# Patient Record
Sex: FEMALE | NOT HISPANIC OR LATINO
[De-identification: names, ages, dates, MRNs, and addresses within clinical notes are randomized per-mention and may not be internally consistent; named-entity substitution may affect disease eponyms.]

---

## 2017-11-16 ENCOUNTER — APPOINTMENT (OUTPATIENT)
Dept: INTERNAL MEDICINE | Facility: CLINIC | Age: 72
End: 2017-11-16
Payer: MEDICARE

## 2017-11-16 ENCOUNTER — LABORATORY RESULT (OUTPATIENT)
Age: 72
End: 2017-11-16

## 2017-11-16 VITALS
HEART RATE: 83 BPM | DIASTOLIC BLOOD PRESSURE: 72 MMHG | RESPIRATION RATE: 16 BRPM | HEIGHT: 58 IN | OXYGEN SATURATION: 96 % | TEMPERATURE: 98.9 F | WEIGHT: 104 LBS | SYSTOLIC BLOOD PRESSURE: 123 MMHG | BODY MASS INDEX: 21.83 KG/M2

## 2017-11-16 DIAGNOSIS — E78.5 HYPERLIPIDEMIA, UNSPECIFIED: ICD-10-CM

## 2017-11-16 DIAGNOSIS — I10 ESSENTIAL (PRIMARY) HYPERTENSION: ICD-10-CM

## 2017-11-16 DIAGNOSIS — M79.1 MYALGIA: ICD-10-CM

## 2017-11-16 DIAGNOSIS — M25.50 PAIN IN UNSPECIFIED JOINT: ICD-10-CM

## 2017-11-16 DIAGNOSIS — Z86.19 PERSONAL HISTORY OF OTHER INFECTIOUS AND PARASITIC DISEASES: ICD-10-CM

## 2017-11-16 DIAGNOSIS — D49.59 NEOPLASM UNSPECIFIED BEHAVIOR OF OTHER GENITOURINARY ORGAN: ICD-10-CM

## 2017-11-16 DIAGNOSIS — R10.13 EPIGASTRIC PAIN: ICD-10-CM

## 2017-11-16 DIAGNOSIS — Z80.1 FAMILY HISTORY OF MALIGNANT NEOPLASM OF TRACHEA, BRONCHUS AND LUNG: ICD-10-CM

## 2017-11-16 DIAGNOSIS — E11.9 TYPE 2 DIABETES MELLITUS W/OUT COMPLICATIONS: ICD-10-CM

## 2017-11-16 DIAGNOSIS — R14.0 ABDOMINAL DISTENSION (GASEOUS): ICD-10-CM

## 2017-11-16 DIAGNOSIS — Z82.49 FAMILY HISTORY OF ISCHEMIC HEART DISEASE AND OTHER DISEASES OF THE CIRCULATORY SYSTEM: ICD-10-CM

## 2017-11-16 DIAGNOSIS — Z63.4 DISAPPEARANCE AND DEATH OF FAMILY MEMBER: ICD-10-CM

## 2017-11-16 DIAGNOSIS — K80.20 CALCULUS OF GALLBLADDER W/OUT CHOLECYSTITIS W/OUT OBSTRUCTION: ICD-10-CM

## 2017-11-16 PROBLEM — Z00.00 ENCOUNTER FOR PREVENTIVE HEALTH EXAMINATION: Status: ACTIVE | Noted: 2017-11-16

## 2017-11-16 PROCEDURE — 99203 OFFICE O/P NEW LOW 30 MIN: CPT

## 2017-11-16 RX ORDER — METFORMIN ER 750 MG 750 MG/1
750 TABLET ORAL TWICE DAILY
Refills: 0 | Status: ACTIVE | COMMUNITY
Start: 2017-11-16

## 2017-11-16 RX ORDER — NEBIVOLOL HYDROCHLORIDE 2.5 MG/1
2.5 TABLET ORAL DAILY
Refills: 0 | Status: ACTIVE | COMMUNITY
Start: 2017-11-16

## 2017-11-16 SDOH — SOCIAL STABILITY - SOCIAL INSECURITY: DISSAPEARANCE AND DEATH OF FAMILY MEMBER: Z63.4

## 2017-11-17 ENCOUNTER — RESULT REVIEW (OUTPATIENT)
Age: 72
End: 2017-11-17

## 2017-11-17 DIAGNOSIS — R74.8 ABNORMAL LEVELS OF OTHER SERUM ENZYMES: ICD-10-CM

## 2017-11-17 DIAGNOSIS — R76.8 OTHER SPECIFIED ABNORMAL IMMUNOLOGICAL FINDINGS IN SERUM: ICD-10-CM

## 2017-11-17 DIAGNOSIS — R17 UNSPECIFIED JAUNDICE: ICD-10-CM

## 2017-11-17 LAB
ALBUMIN SERPL ELPH-MCNC: 4 G/DL
ALP BLD-CCNC: 335 U/L
ALT SERPL-CCNC: 278 U/L
ANION GAP SERPL CALC-SCNC: 20 MMOL/L
AST SERPL-CCNC: 216 U/L
BASOPHILS # BLD AUTO: 0.01 K/UL
BASOPHILS NFR BLD AUTO: 0.1 %
BILIRUB SERPL-MCNC: 1.6 MG/DL
BUN SERPL-MCNC: 19 MG/DL
CALCIUM SERPL-MCNC: 10.3 MG/DL
CCP AB SER IA-ACNC: <8 UNITS
CHLORIDE SERPL-SCNC: 100 MMOL/L
CK SERPL-CCNC: 36 U/L
CO2 SERPL-SCNC: 21 MMOL/L
CREAT SERPL-MCNC: 0.92 MG/DL
CRP SERPL-MCNC: 7 MG/DL
EOSINOPHIL # BLD AUTO: 0.06 K/UL
EOSINOPHIL NFR BLD AUTO: 0.4 %
ERYTHROCYTE [SEDIMENTATION RATE] IN BLOOD BY WESTERGREN METHOD: 36 MM/HR
GLUCOSE SERPL-MCNC: 121 MG/DL
HCT VFR BLD CALC: 39.6 %
HGB BLD-MCNC: 12.6 G/DL
IMM GRANULOCYTES NFR BLD AUTO: 0.1 %
LYMPHOCYTES # BLD AUTO: 0.98 K/UL
LYMPHOCYTES NFR BLD AUTO: 7.2 %
MAN DIFF?: NORMAL
MCHC RBC-ENTMCNC: 29.7 PG
MCHC RBC-ENTMCNC: 31.8 GM/DL
MCV RBC AUTO: 93.4 FL
MONOCYTES # BLD AUTO: 0.67 K/UL
MONOCYTES NFR BLD AUTO: 4.9 %
NEUTROPHILS # BLD AUTO: 11.8 K/UL
NEUTROPHILS NFR BLD AUTO: 87.3 %
PLATELET # BLD AUTO: 233 K/UL
POTASSIUM SERPL-SCNC: 4.3 MMOL/L
PROT SERPL-MCNC: 8.3 G/DL
RBC # BLD: 4.24 M/UL
RBC # FLD: 14.2 %
RF+CCP IGG SER-IMP: NEGATIVE
RHEUMATOID FACT SER QL: 32 IU/ML
SODIUM SERPL-SCNC: 141 MMOL/L
TSH SERPL-ACNC: 0.87 UIU/ML
UREA BREATH TEST QL: NEGATIVE
WBC # FLD AUTO: 13.54 K/UL

## 2017-11-17 RX ORDER — ATORVASTATIN CALCIUM 40 MG/1
40 TABLET, FILM COATED ORAL DAILY
Qty: 90 | Refills: 3 | Status: ACTIVE | COMMUNITY

## 2017-11-17 RX ORDER — AMLODIPINE AND OLMESARTAN MEDOXOMIL 5; 20 MG/1; MG/1
5-20 TABLET ORAL DAILY
Refills: 0 | Status: ACTIVE | COMMUNITY
Start: 2017-10-02

## 2017-11-18 ENCOUNTER — INPATIENT (INPATIENT)
Facility: HOSPITAL | Age: 72
LOS: 6 days | Discharge: HOME | DRG: 418 | End: 2017-11-25
Attending: SURGERY | Admitting: SURGERY
Payer: MEDICARE

## 2017-11-18 VITALS
WEIGHT: 110.01 LBS | OXYGEN SATURATION: 96 % | HEART RATE: 90 BPM | HEIGHT: 60 IN | TEMPERATURE: 97 F | RESPIRATION RATE: 16 BRPM | SYSTOLIC BLOOD PRESSURE: 125 MMHG | DIASTOLIC BLOOD PRESSURE: 68 MMHG

## 2017-11-18 DIAGNOSIS — Z79.899 OTHER LONG TERM (CURRENT) DRUG THERAPY: ICD-10-CM

## 2017-11-18 DIAGNOSIS — E11.9 TYPE 2 DIABETES MELLITUS WITHOUT COMPLICATIONS: ICD-10-CM

## 2017-11-18 DIAGNOSIS — I97.620 POSTPROCEDURAL HEMORRHAGE OF A CIRCULATORY SYSTEM ORGAN OR STRUCTURE FOLLOWING OTHER PROCEDURE: ICD-10-CM

## 2017-11-18 DIAGNOSIS — Z90.722 ACQUIRED ABSENCE OF OVARIES, BILATERAL: ICD-10-CM

## 2017-11-18 DIAGNOSIS — K80.50 CALCULUS OF BILE DUCT WITHOUT CHOLANGITIS OR CHOLECYSTITIS WITHOUT OBSTRUCTION: ICD-10-CM

## 2017-11-18 DIAGNOSIS — Z79.84 LONG TERM (CURRENT) USE OF ORAL HYPOGLYCEMIC DRUGS: ICD-10-CM

## 2017-11-18 DIAGNOSIS — I10 ESSENTIAL (PRIMARY) HYPERTENSION: ICD-10-CM

## 2017-11-18 DIAGNOSIS — R21 RASH AND OTHER NONSPECIFIC SKIN ERUPTION: ICD-10-CM

## 2017-11-18 DIAGNOSIS — I95.89 OTHER HYPOTENSION: ICD-10-CM

## 2017-11-18 DIAGNOSIS — Z90.710 ACQUIRED ABSENCE OF BOTH CERVIX AND UTERUS: Chronic | ICD-10-CM
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
## 2017-11-18 DIAGNOSIS — Z90.79 ACQUIRED ABSENCE OF OTHER GENITAL ORGAN(S): ICD-10-CM

## 2017-11-18 DIAGNOSIS — O34.219 MATERNAL CARE FOR UNSPECIFIED TYPE SCAR FROM PREVIOUS CESAREAN DELIVERY: Chronic | ICD-10-CM

## 2017-11-18 DIAGNOSIS — K80.20 CALCULUS OF GALLBLADDER WITHOUT CHOLECYSTITIS WITHOUT OBSTRUCTION: ICD-10-CM

## 2017-11-18 DIAGNOSIS — E78.00 PURE HYPERCHOLESTEROLEMIA, UNSPECIFIED: ICD-10-CM

## 2017-11-18 DIAGNOSIS — Z28.21 IMMUNIZATION NOT CARRIED OUT BECAUSE OF PATIENT REFUSAL: ICD-10-CM

## 2017-11-18 DIAGNOSIS — Z90.710 ACQUIRED ABSENCE OF BOTH CERVIX AND UTERUS: ICD-10-CM

## 2017-11-18 DIAGNOSIS — K59.00 CONSTIPATION, UNSPECIFIED: ICD-10-CM

## 2017-11-18 DIAGNOSIS — K80.62 CALCULUS OF GALLBLADDER AND BILE DUCT WITH ACUTE CHOLECYSTITIS WITHOUT OBSTRUCTION: ICD-10-CM

## 2017-11-18 DIAGNOSIS — D62 ACUTE POSTHEMORRHAGIC ANEMIA: ICD-10-CM

## 2017-11-18 LAB
ALBUMIN SERPL ELPH-MCNC: 3.8 G/DL — SIGNIFICANT CHANGE UP (ref 3.3–5)
ALP SERPL-CCNC: 503 U/L — HIGH (ref 40–120)
ALT FLD-CCNC: 219 U/L — HIGH (ref 10–45)
AMYLASE P1 CFR SERPL: 104 U/L — SIGNIFICANT CHANGE UP (ref 25–125)
ANA SER IF-ACNC: NEGATIVE
ANION GAP SERPL CALC-SCNC: 12 MMOL/L — SIGNIFICANT CHANGE UP (ref 5–17)
APPEARANCE UR: (no result)
APTT BLD: 32.4 SEC — SIGNIFICANT CHANGE UP (ref 27.5–37.4)
AST SERPL-CCNC: 153 U/L — HIGH (ref 10–40)
BACTERIA # UR AUTO: PRESENT /HPF
BASOPHILS NFR BLD AUTO: 0.1 % — SIGNIFICANT CHANGE UP (ref 0–2)
BILIRUB SERPL-MCNC: 1.7 MG/DL — HIGH (ref 0.2–1.2)
BILIRUB UR-MCNC: NEGATIVE — SIGNIFICANT CHANGE UP
BLD GP AB SCN SERPL QL: NEGATIVE — SIGNIFICANT CHANGE UP
BLD GP AB SCN SERPL QL: NEGATIVE — SIGNIFICANT CHANGE UP
BUN SERPL-MCNC: 16 MG/DL — SIGNIFICANT CHANGE UP (ref 7–23)
CALCIUM SERPL-MCNC: 9.3 MG/DL — SIGNIFICANT CHANGE UP (ref 8.4–10.5)
CHLORIDE SERPL-SCNC: 98 MMOL/L — SIGNIFICANT CHANGE UP (ref 96–108)
CO2 SERPL-SCNC: 26 MMOL/L — SIGNIFICANT CHANGE UP (ref 22–31)
COLOR SPEC: YELLOW — SIGNIFICANT CHANGE UP
COMMENT - URINE: SIGNIFICANT CHANGE UP
CREAT SERPL-MCNC: 0.78 MG/DL — SIGNIFICANT CHANGE UP (ref 0.5–1.3)
DIFF PNL FLD: NEGATIVE — SIGNIFICANT CHANGE UP
EOSINOPHIL NFR BLD AUTO: 0.6 % — SIGNIFICANT CHANGE UP (ref 0–6)
EPI CELLS # UR: (no result) /HPF (ref 0–5)
EXTRA BLUE TOP TUBE: SIGNIFICANT CHANGE UP
EXTRA SST TUBE: SIGNIFICANT CHANGE UP
GLUCOSE BLDC GLUCOMTR-MCNC: 80 MG/DL — SIGNIFICANT CHANGE UP (ref 70–99)
GLUCOSE SERPL-MCNC: 128 MG/DL — HIGH (ref 70–99)
GLUCOSE UR QL: NEGATIVE — SIGNIFICANT CHANGE UP
HCT VFR BLD CALC: 37 % — SIGNIFICANT CHANGE UP (ref 34.5–45)
HGB BLD-MCNC: 12.3 G/DL — SIGNIFICANT CHANGE UP (ref 11.5–15.5)
INR BLD: 1.01 — SIGNIFICANT CHANGE UP (ref 0.88–1.16)
KETONES UR-MCNC: (no result) MG/DL
LACTATE SERPL-SCNC: 1.4 MMOL/L — SIGNIFICANT CHANGE UP (ref 0.5–2)
LEUKOCYTE ESTERASE UR-ACNC: (no result)
LIDOCAIN IGE QN: 74 U/L — HIGH (ref 7–60)
LYMPHOCYTES # BLD AUTO: 16.1 % — SIGNIFICANT CHANGE UP (ref 13–44)
MCHC RBC-ENTMCNC: 29.9 PG — SIGNIFICANT CHANGE UP (ref 27–34)
MCHC RBC-ENTMCNC: 33.2 G/DL — SIGNIFICANT CHANGE UP (ref 32–36)
MCV RBC AUTO: 89.8 FL — SIGNIFICANT CHANGE UP (ref 80–100)
MONOCYTES NFR BLD AUTO: 5.7 % — SIGNIFICANT CHANGE UP (ref 2–14)
NEUTROPHILS NFR BLD AUTO: 77.5 % — HIGH (ref 43–77)
NITRITE UR-MCNC: NEGATIVE — SIGNIFICANT CHANGE UP
PH UR: 6 — SIGNIFICANT CHANGE UP (ref 5–8)
PLATELET # BLD AUTO: 241 K/UL — SIGNIFICANT CHANGE UP (ref 150–400)
POTASSIUM SERPL-MCNC: 4.2 MMOL/L — SIGNIFICANT CHANGE UP (ref 3.5–5.3)
POTASSIUM SERPL-SCNC: 4.2 MMOL/L — SIGNIFICANT CHANGE UP (ref 3.5–5.3)
PROT SERPL-MCNC: 8.4 G/DL — HIGH (ref 6–8.3)
PROT UR-MCNC: NEGATIVE MG/DL — SIGNIFICANT CHANGE UP
PROTHROM AB SERPL-ACNC: 11.2 SEC — SIGNIFICANT CHANGE UP (ref 9.8–12.7)
RBC # BLD: 4.12 M/UL — SIGNIFICANT CHANGE UP (ref 3.8–5.2)
RBC # FLD: 13.5 % — SIGNIFICANT CHANGE UP (ref 10.3–16.9)
RH IG SCN BLD-IMP: POSITIVE — SIGNIFICANT CHANGE UP
RH IG SCN BLD-IMP: POSITIVE — SIGNIFICANT CHANGE UP
SODIUM SERPL-SCNC: 136 MMOL/L — SIGNIFICANT CHANGE UP (ref 135–145)
SP GR SPEC: <=1.005 — SIGNIFICANT CHANGE UP (ref 1–1.03)
UROBILINOGEN FLD QL: 2 E.U./DL
WBC # BLD: 8.9 K/UL — SIGNIFICANT CHANGE UP (ref 3.8–10.5)
WBC # FLD AUTO: 8.9 K/UL — SIGNIFICANT CHANGE UP (ref 3.8–10.5)
WBC UR QL: < 5 /HPF — SIGNIFICANT CHANGE UP

## 2017-11-18 PROCEDURE — 99285 EMERGENCY DEPT VISIT HI MDM: CPT

## 2017-11-18 PROCEDURE — 74181 MRI ABDOMEN W/O CONTRAST: CPT | Mod: 26

## 2017-11-18 PROCEDURE — 71010: CPT | Mod: 26

## 2017-11-18 RX ORDER — METFORMIN HYDROCHLORIDE 850 MG/1
750 TABLET ORAL DAILY
Qty: 0 | Refills: 0 | Status: DISCONTINUED | OUTPATIENT
Start: 2017-11-18 | End: 2017-11-19

## 2017-11-18 RX ORDER — SODIUM CHLORIDE 9 MG/ML
1000 INJECTION, SOLUTION INTRAVENOUS
Qty: 0 | Refills: 0 | Status: DISCONTINUED | OUTPATIENT
Start: 2017-11-18 | End: 2017-11-19

## 2017-11-18 RX ORDER — ATORVASTATIN CALCIUM 80 MG/1
40 TABLET, FILM COATED ORAL AT BEDTIME
Qty: 0 | Refills: 0 | Status: DISCONTINUED | OUTPATIENT
Start: 2017-11-18 | End: 2017-11-20

## 2017-11-18 RX ORDER — PIPERACILLIN AND TAZOBACTAM 4; .5 G/20ML; G/20ML
3.38 INJECTION, POWDER, LYOPHILIZED, FOR SOLUTION INTRAVENOUS EVERY 6 HOURS
Qty: 0 | Refills: 0 | Status: DISCONTINUED | OUTPATIENT
Start: 2017-11-18 | End: 2017-11-20

## 2017-11-18 RX ORDER — INDOMETHACIN 50 MG
100 CAPSULE ORAL
Qty: 0 | Refills: 0 | Status: COMPLETED | OUTPATIENT
Start: 2017-11-18 | End: 2017-11-19

## 2017-11-18 RX ORDER — AMLODIPINE BESYLATE 2.5 MG/1
5 TABLET ORAL DAILY
Qty: 0 | Refills: 0 | Status: DISCONTINUED | OUTPATIENT
Start: 2017-11-18 | End: 2017-11-20

## 2017-11-18 RX ADMIN — PIPERACILLIN AND TAZOBACTAM 200 GRAM(S): 4; .5 INJECTION, POWDER, LYOPHILIZED, FOR SOLUTION INTRAVENOUS at 19:57

## 2017-11-18 RX ADMIN — PIPERACILLIN AND TAZOBACTAM 200 GRAM(S): 4; .5 INJECTION, POWDER, LYOPHILIZED, FOR SOLUTION INTRAVENOUS at 14:08

## 2017-11-18 NOTE — H&P ADULT - HISTORY OF PRESENT ILLNESS
This is a 71yo F with past medical hx of HTN,DM,HLD and past surgical history of  in  and TAHBSO 3 years ago for benign ovarian cyst, p/w abdominal pain for 2 weeks, it was gradual in onset and worsening 2 days ago. It was associated with subjective fever and chills. It was not associated with food, no nausea/vomiting.   Went to PCP yesterday and was told to have elevated alk phosphatase. Pain was worsening at 2am and decided to come to [facility].     WBC: 8.9 with left shift / Hb: 12.3/ Hct: 37/ Platelet: 241  T.Bili : 1.7/ Alk phos : 503/ AST: 153/ alt : 219  Lipase : 74

## 2017-11-18 NOTE — ED ADULT TRIAGE NOTE - OTHER COMPLAINTS
As per son, patient has hx of gallstones, saw a PCP and had blood drawn, reports liver function tests were elevated. Son reports subjective fever at home. Denies any nausea/vomiting.

## 2017-11-18 NOTE — ED PROVIDER NOTE - OBJECTIVE STATEMENT
71 y/o female with hx of HTN, DM, gallstones c/o RUQ abd pain x 1 wk.  + intermittent sharp pain wrosening over past 1 wk. Pt had elevated LFTs as outpt testing this past week. no fever or chills. no n/v/d. no cp or sob. Translation by Dr Desouza pt's son. no further complaints.

## 2017-11-18 NOTE — CHART NOTE - NSCHARTNOTEFT_GEN_A_CORE
PRE OPERATIVE NOTE    Pre-op Diagnosis: choledocholithiasis  Procedure: laparoscopic cholecystectomy  Surgeon: Dr. Smart    Consent to be obtained by attending prior to OR                          12.3   8.9   )-----------( 241      ( 18 Nov 2017 10:37 )             37.0     11-18    136  |  98  |  16  ----------------------------<  128<H>  4.2   |  26  |  0.78    Ca    9.3      18 Nov 2017 10:37    TPro  8.4<H>  /  Alb  3.8  /  TBili  1.7<H>  /  DBili  x   /  AST  153<H>  /  ALT  219<H>  /  AlkPhos  503<H>  11-18    PT/INR - ( 18 Nov 2017 18:24 )   PT: 11.2 sec;   INR: 1.01          PTT - ( 18 Nov 2017 18:24 )  PTT:32.4 sec  Urinalysis Basic - ( 18 Nov 2017 14:46 )    Color: Yellow / Appearance: SL Cloudy / SG: <=1.005 / pH: x  Gluc: x / Ketone: Trace mg/dL  / Bili: Negative / Urobili: 2.0 E.U./dL   Blood: x / Protein: NEGATIVE mg/dL / Nitrite: NEGATIVE   Leuk Esterase: Trace / RBC: x / WBC < 5 /HPF   Sq Epi: x / Non Sq Epi: Moderate /HPF / Bacteria: Present /HPF      Type & Screen: AB positive   CXR: NAP   EKG: NSR        A/P: 72y Female planned for laparoscopic cholecystectomy   NPO  except medications  IVF  Pain/nausea control  Blood on hold, 2 Units  AM labs  amLODIPine   Tablet  piperacillin/tazobactam IVPB.

## 2017-11-18 NOTE — ED PROVIDER NOTE - MEDICAL DECISION MAKING DETAILS
RUQ pain with hx of gallstones. pt well appearing. VSS. labs, ecg and MRCP. MRCP shows large stone in common bile duct. pt admitted to surgery.

## 2017-11-18 NOTE — H&P ADULT - ASSESSMENT
73yo F with PMhx of DM/HTN/HLD with RUQ, possible acute cholecystitis and choledocholithiasis. No leukocytosis but left shift, MRCP suggestive of choledocholithiasis.     Plan :  CBC/CMP/Coags/UA  EKG/CXR  ERCP today with   Medical clearance by   NPO/IVF LR@100cc/hr  pain and nausea control  IV Zosyn 3.375 Q6  SQH/SCD  For OR with  possibly Sunday/Monday    plan d/w

## 2017-11-18 NOTE — H&P ADULT - NSHPPHYSICALEXAM_GEN_ALL_CORE
General: NAD, resting comfortably in bed  C/V: NSR  Pulm: Nonlabored breathing, no respiratory distress  Abd: soft, ND, tender over the RUQ, +bagley  Extrem: WWP, no edema, SCDs in place    Vital Signs Last 24 Hrs  T(C): 36.6 (18 Nov 2017 13:25), Max: 36.7 (18 Nov 2017 12:32)  HR: 64   BP: 125/74  RR: 16   SpO2: 96%

## 2017-11-18 NOTE — PROGRESS NOTE ADULT - SUBJECTIVE AND OBJECTIVE BOX
73yo F with past medical hx of HTN,DM,HLD and past surgical history of  in 1969 and TAHBSO 3 years ago for benign ovarian cyst, h/o gallstones  p/w abdominal pain for 2 weeks, it was gradual in onset and worsening 2 days ago. It was associated with subjective fever and chills. It was not associated with food, no nausea/vomiting.   Went to PCP yesterday and was told to have elevated alk phosphatase. Pain was worsening at 2am and was bought to ER. Labs now with elevated bili  MRCP = CBD stone    WBC: 8.9 with left shift / Hb: 12.3/ Hct: 37/ Platelet: 241  T.Bili : 1.7/ Alk phos : 503/ AST: 153/ alt : 219  Lipase : 74 (2017 13:24)      REVIEW OF SYSTEMS:  Constitutional: No fever, weight loss or fatigue  ENMT:  No difficulty hearing, tinnitus, vertigo; No sinus or throat pain  Respiratory: No cough, wheezing, chills or hemoptysis  Cardiovascular: No chest pain, palpitations, dizziness or leg swelling  Gastrointestinal: + abdominal pain. No nausea, vomiting or hematemesis; No diarrhea or constipation. No melena or hematochezia.  Skin: No itching, burning, rashes or lesions   Musculoskeletal: No joint pain or swelling; No muscle, back or extremity pain    PAST MEDICAL & SURGICAL HISTORY:  High cholesterol  DM (diabetes mellitus)  HTN (hypertension)  H/O total hysterectomy  History of  delivery, antepartum      FAMILY HISTORY:      SOCIAL HISTORY:  Smoking Status: [ ] Current, [ ] Former, [ ] Never  Pack Years:    MEDICATIONS:  MEDICATIONS  (STANDING):  amLODIPine   Tablet 5 milliGRAM(s) Oral daily  atorvastatin 40 milliGRAM(s) Oral at bedtime  lactated ringers. 1000 milliLiter(s) (100 mL/Hr) IV Continuous <Continuous>  metFORMIN  milliGRAM(s) Oral daily  piperacillin/tazobactam IVPB. 3.375 Gram(s) IV Intermittent every 6 hours    MEDICATIONS  (PRN):      Allergies    No Known Allergies    Intolerances        Vital Signs Last 24 Hrs  T(C): 36.6 (2017 13:25), Max: 36.7 (2017 12:32)  T(F): 97.9 (2017 13:25), Max: 98 (2017 12:32)  HR: 64 (2017 13:25) (64 - 90)  BP: 125/74 (2017 13:25) (122/72 - 125/74)  BP(mean): --  RR: 16 (2017 13:25) (16 - 18)  SpO2: 96% (2017 13:25) (95% - 96%)        PHYSICAL EXAM:    General: Well developed; well nourished; in no acute distress  HEENT: MMM, conjunctiva and sclera clear  Lungs: clear  Heart: regular  Gastrointestinal: Soft, sl-tender non-distended; Normal bowel sounds; No rebound or guarding  Extremities: Normal range of motion, No clubbing, cyanosis or edema  Neurological: Alert and oriented x3  Skin: Warm and dry. No obvious rash      LABS:                        12.3   8.9   )-----------( 241      ( 2017 10:37 )             37.0         136  |  98  |  16  ----------------------------<  128<H>  4.2   |  26  |  0.78    Ca    9.3      2017 10:37    TPro  8.4<H>  /  Alb  3.8  /  TBili  1.7<H>  /  DBili  x   /  AST  153<H>  /  ALT  219<H>  /  AlkPhos  503<H>        EKG: (done in ED) NSR No acute changes    RADIOLOGY & ADDITIONAL STUDIES:   Chest Xray  AP view: NAP

## 2017-11-18 NOTE — H&P ADULT - NSHPLABSRESULTS_GEN_ALL_CORE
LABS:                        12.3   8.9   )-----------( 241      ( 18 Nov 2017 10:37 )             37.0     11-18    136  |  98  |  16  ----------------------------<  128<H>  4.2   |  26  |  0.78    Ca    9.3      18 Nov 2017 10:37    TPro  8.4<H>  /  Alb  3.8  /  TBili  1.7<H>  /  DBili  x   /  AST  153<H>  /  ALT  219<H>  /  AlkPhos  503<H>  11-18          RADIOLOGY & ADDITIONAL STUDIES:

## 2017-11-18 NOTE — ED PROVIDER NOTE - ATTENDING CONTRIBUTION TO CARE
72 F diabetic nonsmoker p/w intermittent epigastric ab pain w/o provocative factors, n/v/d/c, chest pain back pain or sob.  PT with rising LFTs as outpt.  Pt looks well, nad, NVI.  Soft nontender belly with nl bowel sounds, nl pulses, no masses and no r/g.  Clear lungs nl heart sounds.  HD stable. Plan labs, ekg, u/s possible MRCP given above and reassess.

## 2017-11-18 NOTE — ED ADULT NURSE NOTE - OBJECTIVE STATEMENT
Patient alert and oriented x 3 chinese speaking translated by her son who is a Dr. here , pt. has history of gallstone , been having generalized non radiating  abdominal intermitent for 2 weeks , last episode was last night described as sharp pain . Denies any nausea nor vomiting . But with subjective fever  and chills .  Motrin  was given at 3am today with relief .  Being seen and examined by ELLEN Jang. Safety maintained . Will continue to monitor .

## 2017-11-18 NOTE — CONSULT NOTE ADULT - ASSESSMENT
72yr old F with PMHx significant for HTN, DM, Dyslipidemia, sp  in  and sp TAHBSO 3 years ago for benign ovarian cyst who presents to the ED for evaluation of abdominal pain for 2 weeks.    #Choledocholithiasis -   - no evidence of cholangitis currently  - MRCP findings suggestive of choledocholithiasis and cholecystitis  - will need stone extraction and possible sphincterotomy  - will plan for an ERCP today  - NPO  - trend LFTs  - interval lap cherrie after ERCP    Discussed with attending Dr Torres  GI following

## 2017-11-19 LAB
ALBUMIN SERPL ELPH-MCNC: 3 G/DL — LOW (ref 3.3–5)
ALP SERPL-CCNC: 373 U/L — HIGH (ref 40–120)
ALT FLD-CCNC: 141 U/L — HIGH (ref 10–45)
AMYLASE P1 CFR SERPL: 222 U/L — HIGH (ref 25–125)
ANION GAP SERPL CALC-SCNC: 17 MMOL/L — SIGNIFICANT CHANGE UP (ref 5–17)
AST SERPL-CCNC: 81 U/L — HIGH (ref 10–40)
BILIRUB SERPL-MCNC: 1.2 MG/DL — SIGNIFICANT CHANGE UP (ref 0.2–1.2)
BUN SERPL-MCNC: 12 MG/DL — SIGNIFICANT CHANGE UP (ref 7–23)
CALCIUM SERPL-MCNC: 8.8 MG/DL — SIGNIFICANT CHANGE UP (ref 8.4–10.5)
CHLORIDE SERPL-SCNC: 97 MMOL/L — SIGNIFICANT CHANGE UP (ref 96–108)
CO2 SERPL-SCNC: 22 MMOL/L — SIGNIFICANT CHANGE UP (ref 22–31)
CREAT SERPL-MCNC: 0.67 MG/DL — SIGNIFICANT CHANGE UP (ref 0.5–1.3)
GLUCOSE SERPL-MCNC: 81 MG/DL — SIGNIFICANT CHANGE UP (ref 70–99)
HCT VFR BLD CALC: 34.5 % — SIGNIFICANT CHANGE UP (ref 34.5–45)
HGB BLD-MCNC: 11.5 G/DL — SIGNIFICANT CHANGE UP (ref 11.5–15.5)
LIDOCAIN IGE QN: 129 U/L — HIGH (ref 7–60)
MAGNESIUM SERPL-MCNC: 1.6 MG/DL — SIGNIFICANT CHANGE UP (ref 1.6–2.6)
MCHC RBC-ENTMCNC: 30.1 PG — SIGNIFICANT CHANGE UP (ref 27–34)
MCHC RBC-ENTMCNC: 33.3 G/DL — SIGNIFICANT CHANGE UP (ref 32–36)
MCV RBC AUTO: 90.3 FL — SIGNIFICANT CHANGE UP (ref 80–100)
PHOSPHATE SERPL-MCNC: 3.9 MG/DL — SIGNIFICANT CHANGE UP (ref 2.5–4.5)
PLATELET # BLD AUTO: 207 K/UL — SIGNIFICANT CHANGE UP (ref 150–400)
POTASSIUM SERPL-MCNC: 3.4 MMOL/L — LOW (ref 3.5–5.3)
POTASSIUM SERPL-SCNC: 3.4 MMOL/L — LOW (ref 3.5–5.3)
PROT SERPL-MCNC: 7.3 G/DL — SIGNIFICANT CHANGE UP (ref 6–8.3)
RBC # BLD: 3.82 M/UL — SIGNIFICANT CHANGE UP (ref 3.8–5.2)
RBC # FLD: 13.5 % — SIGNIFICANT CHANGE UP (ref 10.3–16.9)
SODIUM SERPL-SCNC: 136 MMOL/L — SIGNIFICANT CHANGE UP (ref 135–145)
WBC # BLD: 8.5 K/UL — SIGNIFICANT CHANGE UP (ref 3.8–10.5)
WBC # FLD AUTO: 8.5 K/UL — SIGNIFICANT CHANGE UP (ref 3.8–10.5)

## 2017-11-19 RX ORDER — HYDROMORPHONE HYDROCHLORIDE 2 MG/ML
1 INJECTION INTRAMUSCULAR; INTRAVENOUS; SUBCUTANEOUS EVERY 4 HOURS
Qty: 0 | Refills: 0 | Status: DISCONTINUED | OUTPATIENT
Start: 2017-11-19 | End: 2017-11-19

## 2017-11-19 RX ORDER — ACETAMINOPHEN 500 MG
750 TABLET ORAL ONCE
Qty: 0 | Refills: 0 | Status: DISCONTINUED | OUTPATIENT
Start: 2017-11-19 | End: 2017-11-20

## 2017-11-19 RX ORDER — AMLODIPINE BESYLATE 2.5 MG/1
1 TABLET ORAL
Qty: 0 | Refills: 0 | COMMUNITY

## 2017-11-19 RX ORDER — SODIUM CHLORIDE 9 MG/ML
1000 INJECTION, SOLUTION INTRAVENOUS
Qty: 0 | Refills: 0 | Status: DISCONTINUED | OUTPATIENT
Start: 2017-11-19 | End: 2017-11-20

## 2017-11-19 RX ORDER — MAGNESIUM SULFATE 500 MG/ML
2 VIAL (ML) INJECTION ONCE
Qty: 0 | Refills: 0 | Status: COMPLETED | OUTPATIENT
Start: 2017-11-19 | End: 2017-11-19

## 2017-11-19 RX ORDER — POTASSIUM CHLORIDE 20 MEQ
10 PACKET (EA) ORAL
Qty: 0 | Refills: 0 | Status: COMPLETED | OUTPATIENT
Start: 2017-11-19 | End: 2017-11-19

## 2017-11-19 RX ORDER — ATORVASTATIN CALCIUM 80 MG/1
1 TABLET, FILM COATED ORAL
Qty: 0 | Refills: 0 | COMMUNITY

## 2017-11-19 RX ORDER — HEPARIN SODIUM 5000 [USP'U]/ML
5000 INJECTION INTRAVENOUS; SUBCUTANEOUS EVERY 12 HOURS
Qty: 0 | Refills: 0 | Status: DISCONTINUED | OUTPATIENT
Start: 2017-11-19 | End: 2017-11-20

## 2017-11-19 RX ORDER — PANTOPRAZOLE SODIUM 20 MG/1
40 TABLET, DELAYED RELEASE ORAL DAILY
Qty: 0 | Refills: 0 | Status: DISCONTINUED | OUTPATIENT
Start: 2017-11-19 | End: 2017-11-20

## 2017-11-19 RX ORDER — NEBIVOLOL HYDROCHLORIDE 5 MG/1
1 TABLET ORAL
Qty: 0 | Refills: 0 | COMMUNITY

## 2017-11-19 RX ORDER — METFORMIN HYDROCHLORIDE 850 MG/1
1 TABLET ORAL
Qty: 0 | Refills: 0 | COMMUNITY

## 2017-11-19 RX ORDER — HYDROMORPHONE HYDROCHLORIDE 2 MG/ML
0.5 INJECTION INTRAMUSCULAR; INTRAVENOUS; SUBCUTANEOUS EVERY 4 HOURS
Qty: 0 | Refills: 0 | Status: DISCONTINUED | OUTPATIENT
Start: 2017-11-19 | End: 2017-11-20

## 2017-11-19 RX ADMIN — Medication 100 MILLIGRAM(S): at 10:44

## 2017-11-19 RX ADMIN — Medication 100 MILLIEQUIVALENT(S): at 12:08

## 2017-11-19 RX ADMIN — AMLODIPINE BESYLATE 5 MILLIGRAM(S): 2.5 TABLET ORAL at 07:45

## 2017-11-19 RX ADMIN — ATORVASTATIN CALCIUM 40 MILLIGRAM(S): 80 TABLET, FILM COATED ORAL at 21:03

## 2017-11-19 RX ADMIN — HYDROMORPHONE HYDROCHLORIDE 0.5 MILLIGRAM(S): 2 INJECTION INTRAMUSCULAR; INTRAVENOUS; SUBCUTANEOUS at 02:21

## 2017-11-19 RX ADMIN — Medication 50 GRAM(S): at 09:29

## 2017-11-19 RX ADMIN — Medication 100 MILLIEQUIVALENT(S): at 14:26

## 2017-11-19 RX ADMIN — SODIUM CHLORIDE 100 MILLILITER(S): 9 INJECTION, SOLUTION INTRAVENOUS at 07:19

## 2017-11-19 RX ADMIN — Medication 100 MILLIGRAM(S): at 11:00

## 2017-11-19 RX ADMIN — Medication 100 MILLIEQUIVALENT(S): at 10:44

## 2017-11-19 RX ADMIN — PIPERACILLIN AND TAZOBACTAM 200 GRAM(S): 4; .5 INJECTION, POWDER, LYOPHILIZED, FOR SOLUTION INTRAVENOUS at 07:19

## 2017-11-19 RX ADMIN — HYDROMORPHONE HYDROCHLORIDE 0.5 MILLIGRAM(S): 2 INJECTION INTRAMUSCULAR; INTRAVENOUS; SUBCUTANEOUS at 07:23

## 2017-11-19 RX ADMIN — Medication 100 MILLIGRAM(S): at 00:30

## 2017-11-19 RX ADMIN — PIPERACILLIN AND TAZOBACTAM 200 GRAM(S): 4; .5 INJECTION, POWDER, LYOPHILIZED, FOR SOLUTION INTRAVENOUS at 01:47

## 2017-11-19 RX ADMIN — PANTOPRAZOLE SODIUM 40 MILLIGRAM(S): 20 TABLET, DELAYED RELEASE ORAL at 18:17

## 2017-11-19 RX ADMIN — HYDROMORPHONE HYDROCHLORIDE 0.5 MILLIGRAM(S): 2 INJECTION INTRAMUSCULAR; INTRAVENOUS; SUBCUTANEOUS at 01:51

## 2017-11-19 RX ADMIN — PIPERACILLIN AND TAZOBACTAM 200 GRAM(S): 4; .5 INJECTION, POWDER, LYOPHILIZED, FOR SOLUTION INTRAVENOUS at 13:46

## 2017-11-19 RX ADMIN — PIPERACILLIN AND TAZOBACTAM 200 GRAM(S): 4; .5 INJECTION, POWDER, LYOPHILIZED, FOR SOLUTION INTRAVENOUS at 18:17

## 2017-11-19 RX ADMIN — HEPARIN SODIUM 5000 UNIT(S): 5000 INJECTION INTRAVENOUS; SUBCUTANEOUS at 18:17

## 2017-11-19 RX ADMIN — Medication 100 MILLIGRAM(S): at 00:00

## 2017-11-19 NOTE — PROGRESS NOTE ADULT - ASSESSMENT
A/P: 72y Female s/p ERCP  NPO  IVF: LR @100cc/hr  Pain/nausea control  SQH/SCDs/OOBA/IS  TOV 730am  AM labs  plan to go to OR tomorrow with Dr. Smart for laparoscopic cholecystectomy A/P: 72y Female s/p ERCP  NPO  IVF: LR @100cc/hr  Pain/nausea control  IV zosyn  SQH/SCDs/OOBA/IS  TOV 730am  AM labs  plan to go to OR tomorrow with Dr. Smart for laparoscopic cholecystectomy

## 2017-11-19 NOTE — PROGRESS NOTE ADULT - ASSESSMENT
71yo F with PMhx of DM/HTN/HLD with RUQ, possible acute cholecystitis and choledocholithiasis. No leukocytosis but left shift, MRCP suggestive of choledocholithiasis.     CBC/CMP/Coags/UA  EKG/CXR  ERCP today with   Medical clearance by   NPO/IVF LR@100cc/hr  pain and nausea control  IV Zosyn 3.375 Q6  SQH/SCD  For OR with  possibly Sunday/Monday 73yo F with PMhx of DM/HTN/HLD with RUQ, possible acute cholecystitis and choledocholithiasis. No leukocytosis but left shift, MRCP suggestive of choledocholithiasis.     CBC/CMP/Coags/UA  NPO/IVF LR@100cc/hr  pain and nausea control  IV Zosyn 3.375 Q6  SQH/SCD  For OR with  today

## 2017-11-19 NOTE — PROGRESS NOTE ADULT - SUBJECTIVE AND OBJECTIVE BOX
Pt seen and examined at bedside  c/o some abdominal discomfort RUQ/epigastrium post ERCP  denies n/v/d, fever, chills    MEDICATIONS:  MEDICATIONS  (STANDING):  acetaminophen  IVPB. 750 milliGRAM(s) IV Intermittent once  amLODIPine   Tablet 5 milliGRAM(s) Oral daily  atorvastatin 40 milliGRAM(s) Oral at bedtime  heparin  Injectable 5000 Unit(s) SubCutaneous every 12 hours  indomethacin Suppository 100 milliGRAM(s) Rectal two times a day  pantoprazole  Injectable 40 milliGRAM(s) IV Push daily  piperacillin/tazobactam IVPB. 3.375 Gram(s) IV Intermittent every 6 hours    MEDICATIONS  (PRN):  HYDROmorphone  Injectable 0.5 milliGRAM(s) IV Push every 4 hours PRN Moderate Pain (4 - 6)  HYDROmorphone  Injectable 1 milliGRAM(s) IV Push every 4 hours PRN Severe Pain (7 - 10)      Allergies  No Known Allergies    Intolerances      Vital Signs Last 24 Hrs  T(C): 37.2 (19 Nov 2017 12:47), Max: 37.2 (19 Nov 2017 05:52)  T(F): 98.9 (19 Nov 2017 12:47), Max: 98.9 (19 Nov 2017 05:52)  HR: 57 (19 Nov 2017 12:47) (57 - 82)  BP: 135/74 (19 Nov 2017 12:47) (116/67 - 145/64)  BP(mean): 86 (18 Nov 2017 23:25) (86 - 101)  RR: 16 (19 Nov 2017 12:47) (15 - 21)  SpO2: 97% (19 Nov 2017 12:47) (95% - 98%)    11-18 @ 07:01  -  11-19 @ 07:00  --------------------------------------------------------  IN: 1500 mL / OUT: 500 mL / NET: 1000 mL    11-19 @ 07:01 - 11-19 @ 16:38  --------------------------------------------------------  IN: 0 mL / OUT: 1050 mL / NET: -1050 mL      PHYSICAL EXAM:  GEN - elderly F lying in bed in no distress, anicteric, afebrile, not pale  Gastrointestinal: full, soft, BS+, +tenderness RUQ and epigastric region, NR, NG, no masses or organs palpated  Neurological: AAOx3 non focal    LABS:  CBC Full  -  ( 19 Nov 2017 07:31 )  WBC Count : 8.5 K/uL  Hemoglobin : 11.5 g/dL  Hematocrit : 34.5 %  Platelet Count - Automated : 207 K/uL  Mean Cell Volume : 90.3 fL  Mean Cell Hemoglobin : 30.1 pg  Mean Cell Hemoglobin Concentration : 33.3 g/dL    136  |  97  |  12  ----------------------------<  81  3.4<L>   |  22  |  0.67    Ca    8.8      19 Nov 2017 07:31  Phos  3.9     11-19  Mg     1.6     11-19    TPro  7.3  /  Alb  3.0<L>  /  TBili  1.2  /  DBili  x   /  AST  81<H>  /  ALT  141<H>  /  AlkPhos  373<H>  11-19    PT/INR - ( 18 Nov 2017 18:24 )   PT: 11.2 sec;   INR: 1.01       PTT - ( 18 Nov 2017 18:24 )  PTT:32.4 sec    Urinalysis Basic - ( 18 Nov 2017 14:46 )    Color: Yellow / Appearance: SL Cloudy / SG: <=1.005 / pH: x  Gluc: x / Ketone: Trace mg/dL  / Bili: Negative / Urobili: 2.0 E.U./dL   Blood: x / Protein: NEGATIVE mg/dL / Nitrite: NEGATIVE   Leuk Esterase: Trace / RBC: x / WBC < 5 /HPF   Sq Epi: x / Non Sq Epi: Moderate /HPF / Bacteria: Present /HPF      RADIOLOGY & ADDITIONAL STUDIES (The following images were personally reviewed):

## 2017-11-19 NOTE — PROGRESS NOTE ADULT - SUBJECTIVE AND OBJECTIVE BOX
O/N:ERCP done: sphincterotomy, multiple stones removed, stent placed. NPO/IVF, iv abx. passed TOV.VSS. MARK  11/18: new admission for acute cholecystitis with possible choledocholithiasis. will go for ERCP with Dr. Torres this evening, medical clearance by Dr. Jean, OR with Dr. Smart tomorrow INTERVAL/OVERNIGHT EVENTS :ERCP  sphincterotomy, multiple stones removed, stent placed. NPO/IVF, iv abx. passed TOV.VSS. MARK  11/18: new admission for acute cholecystitis with possible choledocholithiasis. will go for ERCP with Dr. Torres this evening, medical clearance by Dr. Jean, OR with Dr. Smart tomorrow.      STATUS POST:  ERCP  sphincterotomy, multiple stones removed, stent placed    POST OPERATIVE DAY #: 1    SUBJECTIVE: Patient examined bedside. Patient complains of RUQ pain  Flatus: [] YES [X] NO             Bowel Movement: [ ] YES [X ] NO  Pain (0-10):            Pain Control Adequate: [X ] YES [ ] NO  Nausea: [ ] YES [X ] NO            Vomiting: [ ] YES [X ] NO  Diarrhea: [ ] YES [X ] NO         Constipation: [ ] YES [X ] NO     Chest Pain: [ ] YES [ X] NO    SOB:  [ ] YES [ X] NO    amLODIPine   Tablet 5 milliGRAM(s) Oral daily  piperacillin/tazobactam IVPB. 3.375 Gram(s) IV Intermittent every 6 hours      Vital Signs Last 24 Hrs  T(C): 37.2 (19 Nov 2017 05:52), Max: 37.2 (19 Nov 2017 05:52)  T(F): 98.9 (19 Nov 2017 05:52), Max: 98.9 (19 Nov 2017 05:52)  HR: 72 (19 Nov 2017 05:52) (62 - 90)  BP: 132/70 (19 Nov 2017 05:52) (117/73 - 145/64)  BP(mean): 86 (18 Nov 2017 23:25) (86 - 101)  RR: 16 (19 Nov 2017 05:52) (15 - 21)  SpO2: 95% (19 Nov 2017 05:52) (95% - 98%)  I&O's Detail    18 Nov 2017 07:01  -  19 Nov 2017 07:00  --------------------------------------------------------  IN:    lactated ringers.: 1300 mL    Solution: 200 mL  Total IN: 1500 mL    OUT:    Voided: 500 mL  Total OUT: 500 mL    Total NET: 1000 mL          General: NAD, resting comfortably in bed  C/V: NSR  Pulm: Nonlabored breathing, no respiratory distress  Abd: soft, non distended , RUQ tenderness to palpation  Extrem: WWP, no edema, SCDs in place        LABS:                        11.5   8.5   )-----------( 207      ( 19 Nov 2017 07:31 )             34.5     11-19    136  |  97  |  12  ----------------------------<  81  3.4<L>   |  22  |  0.67    Ca    8.8      19 Nov 2017 07:31  Phos  3.9     11-19  Mg     1.6     11-19    TPro  7.3  /  Alb  3.0<L>  /  TBili  1.2  /  DBili  x   /  AST  81<H>  /  ALT  141<H>  /  AlkPhos  373<H>  11-19    PT/INR - ( 18 Nov 2017 18:24 )   PT: 11.2 sec;   INR: 1.01          PTT - ( 18 Nov 2017 18:24 )  PTT:32.4 sec  Urinalysis Basic - ( 18 Nov 2017 14:46 )    Color: Yellow / Appearance: SL Cloudy / SG: <=1.005 / pH: x  Gluc: x / Ketone: Trace mg/dL  / Bili: Negative / Urobili: 2.0 E.U./dL   Blood: x / Protein: NEGATIVE mg/dL / Nitrite: NEGATIVE   Leuk Esterase: Trace / RBC: x / WBC < 5 /HPF   Sq Epi: x / Non Sq Epi: Moderate /HPF / Bacteria: Present /HPF

## 2017-11-19 NOTE — PROGRESS NOTE ADULT - ASSESSMENT
72yr old F with PMHx significant for HTN, DM, Dyslipidemia, sp  in  and sp TAHBSO 3 years ago for benign ovarian cyst who presents to the ED for evaluation of abdominal pain for 2 weeks.    #Choledocholithiasis -   - sp ERCP 17 - with multiple stones in CBD and some pus suggestive of cholangitis, sphincterotomy, and double pigtail stent placement  - continue with IV abx  - monitor abdominal exams - for post ERCP pancreatitis - received indomethacin post ERCP and lipase level not 3xULN  - pain management per primary team  - LFTs downtrending   - interval lap cherrie after ERCP      Discussed with attending Dr Torres  GI following

## 2017-11-19 NOTE — PROGRESS NOTE ADULT - SUBJECTIVE AND OBJECTIVE BOX
Pt seen and examined No complaints.  S/P ERCP sphincterotomy, stone extraction, stents    REVIEW OF SYSTEMS:  Constitutional: No fever, weight loss or fatigue  Cardiovascular: No chest pain, palpitations, dizziness or leg swelling  Gastrointestinal: No abdominal or epigastric pain. No nausea, vomiting or hematemesis; No diarrhea or constipation. No melena or hematochezia.  Skin: No itching, burning, rashes or lesions       MEDICATIONS:  MEDICATIONS  (STANDING):  acetaminophen  IVPB. 750 milliGRAM(s) IV Intermittent once  amLODIPine   Tablet 5 milliGRAM(s) Oral daily  atorvastatin 40 milliGRAM(s) Oral at bedtime  heparin  Injectable 5000 Unit(s) SubCutaneous every 12 hours  indomethacin Suppository 100 milliGRAM(s) Rectal two times a day  lactated ringers. 1000 milliLiter(s) (100 mL/Hr) IV Continuous <Continuous>  metFORMIN  milliGRAM(s) Oral daily  piperacillin/tazobactam IVPB. 3.375 Gram(s) IV Intermittent every 6 hours  potassium chloride  10 mEq/100 mL IVPB 10 milliEquivalent(s) IV Intermittent every 1 hour    MEDICATIONS  (PRN):  HYDROmorphone  Injectable 0.5 milliGRAM(s) IV Push every 4 hours PRN Moderate Pain (4 - 6)  HYDROmorphone  Injectable 1 milliGRAM(s) IV Push every 4 hours PRN Severe Pain (7 - 10)      Allergies    No Known Allergies    Intolerances        Vital Signs Last 24 Hrs  T(C): 37.2 (19 Nov 2017 05:52), Max: 37.2 (19 Nov 2017 05:52)  T(F): 98.9 (19 Nov 2017 05:52), Max: 98.9 (19 Nov 2017 05:52)  HR: 72 (19 Nov 2017 05:52) (62 - 82)  BP: 132/70 (19 Nov 2017 05:52) (117/73 - 145/64)  BP(mean): 86 (18 Nov 2017 23:25) (86 - 101)  RR: 16 (19 Nov 2017 05:52) (15 - 21)  SpO2: 95% (19 Nov 2017 05:52) (95% - 98%)    11-18 @ 07:01  -  11-19 @ 07:00  --------------------------------------------------------  IN: 1500 mL / OUT: 500 mL / NET: 1000 mL    11-19 @ 07:01  -  11-19 @ 12:17  --------------------------------------------------------  IN: 0 mL / OUT: 750 mL / NET: -750 mL        PHYSICAL EXAM:    General: Well developed; well nourished; in no acute distress  HEENT: MMM, conjunctiva and sclera clear  Lungs: clear  Heart: regular  Gastrointestinal: Soft non-tender non-distended; Normal bowel sounds; No hepatosplenomegaly  Skin: Warm and dry. No obvious rash  Ext: no edema    LABS:      CBC Full  -  ( 19 Nov 2017 07:31 )  WBC Count : 8.5 K/uL  Hemoglobin : 11.5 g/dL  Hematocrit : 34.5 %  Platelet Count - Automated : 207 K/uL  Mean Cell Volume : 90.3 fL  Mean Cell Hemoglobin : 30.1 pg  Mean Cell Hemoglobin Concentration : 33.3 g/dL  Auto Neutrophil # : x  Auto Lymphocyte # : x  Auto Monocyte # : x  Auto Eosinophil # : x  Auto Basophil # : x  Auto Neutrophil % : x  Auto Lymphocyte % : x  Auto Monocyte % : x  Auto Eosinophil % : x  Auto Basophil % : x    11-19    136  |  97  |  12  ----------------------------<  81  3.4<L>   |  22  |  0.67    Ca    8.8      19 Nov 2017 07:31  Phos  3.9     11-19  Mg     1.6     11-19    TPro  7.3  /  Alb  3.0<L>  /  TBili  1.2  /  DBili  x   /  AST  81<H>  /  ALT  141<H>  /  AlkPhos  373<H>  11-19    PT/INR - ( 18 Nov 2017 18:24 )   PT: 11.2 sec;   INR: 1.01          PTT - ( 18 Nov 2017 18:24 )  PTT:32.4 sec      Urinalysis Basic - ( 18 Nov 2017 14:46 )    Color: Yellow / Appearance: SL Cloudy / SG: <=1.005 / pH: x  Gluc: x / Ketone: Trace mg/dL  / Bili: Negative / Urobili: 2.0 E.U./dL   Blood: x / Protein: NEGATIVE mg/dL / Nitrite: NEGATIVE   Leuk Esterase: Trace / RBC: x / WBC < 5 /HPF   Sq Epi: x / Non Sq Epi: Moderate /HPF / Bacteria: Present /HPF                RADIOLOGY & ADDITIONAL STUDIES (The following images were personally reviewed):

## 2017-11-19 NOTE — PROGRESS NOTE ADULT - SUBJECTIVE AND OBJECTIVE BOX
POST-OPERATIVE NOTE    Procedure: ERCP    Diagnosis/Indication: choledocolithiasis    Surgeon: Dr. Torres    S: Pt has no complaints. Denies CP, SOB, VARGAS, calf tenderness, nausea, and vomiting. Pain controlled with medication.    O:  T(C): 36.3 (11-18-17 @ 22:55), Max: 36.3 (11-18-17 @ 22:55)  T(F): 97.3 (11-18-17 @ 22:55), Max: 97.3 (11-18-17 @ 22:55)  HR: 62 (11-18-17 @ 23:37) (62 - 82)  BP: 127/75 (11-18-17 @ 23:37) (119/86 - 145/64)  RR: 16 (11-18-17 @ 23:37) (15 - 21)  SpO2: 97% (11-18-17 @ 23:37) (97% - 98%)  Wt(kg): --                        12.3   8.9   )-----------( 241      ( 18 Nov 2017 10:37 )             37.0     11-18    136  |  98  |  16  ----------------------------<  128<H>  4.2   |  26  |  0.78    Ca    9.3      18 Nov 2017 10:37    TPro  8.4<H>  /  Alb  3.8  /  TBili  1.7<H>  /  DBili  x   /  AST  153<H>  /  ALT  219<H>  /  AlkPhos  503<H>  11-18      Gen: NAD, resting comfortably in bed  C/V: NSR  Pulm: Nonlabored breathing, no respiratory distress  Abd: soft, NT/ND  Extrem: WWP, no calf edema or tenderness, SCDs in place

## 2017-11-20 ENCOUNTER — RESULT REVIEW (OUTPATIENT)
Age: 72
End: 2017-11-20

## 2017-11-20 DIAGNOSIS — B19.10 UNSPECIFIED VIRAL HEPATITIS B W/OUT HEPATIC COMA: ICD-10-CM

## 2017-11-20 LAB
ALBUMIN SERPL ELPH-MCNC: 2.4 G/DL — LOW (ref 3.3–5)
ALBUMIN SERPL ELPH-MCNC: 2.7 G/DL — LOW (ref 3.3–5)
ALBUMIN SERPL ELPH-MCNC: 3.7 G/DL — SIGNIFICANT CHANGE UP (ref 3.3–5)
ALP SERPL-CCNC: 150 U/L — HIGH (ref 40–120)
ALP SERPL-CCNC: 230 U/L — HIGH (ref 40–120)
ALP SERPL-CCNC: 344 U/L — HIGH (ref 40–120)
ALT FLD-CCNC: 116 U/L — HIGH (ref 10–45)
ALT FLD-CCNC: 93 U/L — HIGH (ref 10–45)
ALT FLD-CCNC: 97 U/L — HIGH (ref 10–45)
AMYLASE P1 CFR SERPL: 285 U/L — HIGH (ref 25–125)
ANION GAP SERPL CALC-SCNC: 11 MMOL/L — SIGNIFICANT CHANGE UP (ref 5–17)
ANION GAP SERPL CALC-SCNC: 13 MMOL/L — SIGNIFICANT CHANGE UP (ref 5–17)
ANION GAP SERPL CALC-SCNC: 18 MMOL/L — HIGH (ref 5–17)
APTT BLD: 35.8 SEC — SIGNIFICANT CHANGE UP (ref 27.5–37.4)
APTT BLD: 40.9 SEC — HIGH (ref 27.5–37.4)
APTT BLD: 44.7 SEC — HIGH (ref 27.5–37.4)
AST SERPL-CCNC: 102 U/L — HIGH (ref 10–40)
AST SERPL-CCNC: 51 U/L — HIGH (ref 10–40)
AST SERPL-CCNC: 76 U/L — HIGH (ref 10–40)
BASE EXCESS BLDA CALC-SCNC: -2.9 MMOL/L — LOW (ref -2–3)
BASE EXCESS BLDA CALC-SCNC: -4.1 MMOL/L — LOW (ref -2–3)
BASE EXCESS BLDV CALC-SCNC: -1.3 MMOL/L — SIGNIFICANT CHANGE UP
BASOPHILS NFR BLD AUTO: 0.1 % — SIGNIFICANT CHANGE UP (ref 0–2)
BILIRUB SERPL-MCNC: 0.7 MG/DL — SIGNIFICANT CHANGE UP (ref 0.2–1.2)
BILIRUB SERPL-MCNC: 1.1 MG/DL — SIGNIFICANT CHANGE UP (ref 0.2–1.2)
BILIRUB SERPL-MCNC: 1.5 MG/DL — HIGH (ref 0.2–1.2)
BUN SERPL-MCNC: 10 MG/DL — SIGNIFICANT CHANGE UP (ref 7–23)
BUN SERPL-MCNC: 7 MG/DL — SIGNIFICANT CHANGE UP (ref 7–23)
BUN SERPL-MCNC: 7 MG/DL — SIGNIFICANT CHANGE UP (ref 7–23)
CA-I BLDA-SCNC: 1.03 MMOL/L — LOW (ref 1.12–1.3)
CA-I SERPL-SCNC: 1.16 MMOL/L — SIGNIFICANT CHANGE UP (ref 1.12–1.3)
CALCIUM SERPL-MCNC: 7.5 MG/DL — LOW (ref 8.4–10.5)
CALCIUM SERPL-MCNC: 8.1 MG/DL — LOW (ref 8.4–10.5)
CALCIUM SERPL-MCNC: 9.1 MG/DL — SIGNIFICANT CHANGE UP (ref 8.4–10.5)
CHLORIDE SERPL-SCNC: 104 MMOL/L — SIGNIFICANT CHANGE UP (ref 96–108)
CHLORIDE SERPL-SCNC: 106 MMOL/L — SIGNIFICANT CHANGE UP (ref 96–108)
CHLORIDE SERPL-SCNC: 98 MMOL/L — SIGNIFICANT CHANGE UP (ref 96–108)
CK MB CFR SERPL CALC: 1.5 NG/ML — SIGNIFICANT CHANGE UP (ref 0–6.7)
CK SERPL-CCNC: 42 U/L — SIGNIFICANT CHANGE UP (ref 25–170)
CO2 SERPL-SCNC: 21 MMOL/L — LOW (ref 22–31)
CO2 SERPL-SCNC: 24 MMOL/L — SIGNIFICANT CHANGE UP (ref 22–31)
CO2 SERPL-SCNC: 24 MMOL/L — SIGNIFICANT CHANGE UP (ref 22–31)
COHGB MFR BLDA: 0.3 % — SIGNIFICANT CHANGE UP
CREAT SERPL-MCNC: 0.59 MG/DL — SIGNIFICANT CHANGE UP (ref 0.5–1.3)
CREAT SERPL-MCNC: 0.64 MG/DL — SIGNIFICANT CHANGE UP (ref 0.5–1.3)
CREAT SERPL-MCNC: 0.7 MG/DL — SIGNIFICANT CHANGE UP (ref 0.5–1.3)
EOSINOPHIL NFR BLD AUTO: 0.5 % — SIGNIFICANT CHANGE UP (ref 0–6)
GAS PNL BLDA: SIGNIFICANT CHANGE UP
GAS PNL BLDA: SIGNIFICANT CHANGE UP
GAS PNL BLDV: 160 MMOL/L — CRITICAL HIGH (ref 138–146)
GAS PNL BLDV: SIGNIFICANT CHANGE UP
GAS PNL BLDV: SIGNIFICANT CHANGE UP
GLUCOSE BLDC GLUCOMTR-MCNC: 129 MG/DL — HIGH (ref 70–99)
GLUCOSE BLDC GLUCOMTR-MCNC: 173 MG/DL — HIGH (ref 70–99)
GLUCOSE BLDC GLUCOMTR-MCNC: 225 MG/DL — HIGH (ref 70–99)
GLUCOSE SERPL-MCNC: 147 MG/DL — HIGH (ref 70–99)
GLUCOSE SERPL-MCNC: 179 MG/DL — HIGH (ref 70–99)
GLUCOSE SERPL-MCNC: 203 MG/DL — HIGH (ref 70–99)
HCO3 BLDA-SCNC: 20 MMOL/L — LOW (ref 21–28)
HCO3 BLDA-SCNC: 21 MMOL/L — SIGNIFICANT CHANGE UP (ref 21–28)
HCO3 BLDV-SCNC: 24 MMOL/L — SIGNIFICANT CHANGE UP (ref 20–27)
HCT VFR BLD CALC: 24.6 % — LOW (ref 34.5–45)
HCT VFR BLD CALC: 26.6 % — LOW (ref 34.5–45)
HCT VFR BLD CALC: 29.4 % — LOW (ref 34.5–45)
HCT VFR BLD CALC: 38 % — SIGNIFICANT CHANGE UP (ref 34.5–45)
HGB BLD-MCNC: 13 G/DL — SIGNIFICANT CHANGE UP (ref 11.5–15.5)
HGB BLD-MCNC: 8.5 G/DL — LOW (ref 11.5–15.5)
HGB BLD-MCNC: 9 G/DL — LOW (ref 11.5–15.5)
HGB BLD-MCNC: 9.8 G/DL — LOW (ref 11.5–15.5)
HGB BLDA-MCNC: 11.2 G/DL — LOW (ref 11.5–15.5)
INR BLD: 1.08 — SIGNIFICANT CHANGE UP (ref 0.88–1.16)
INR BLD: 1.1 — SIGNIFICANT CHANGE UP (ref 0.88–1.16)
INR BLD: 1.14 — SIGNIFICANT CHANGE UP (ref 0.88–1.16)
LACTATE SERPL-SCNC: 3.1 MMOL/L — HIGH (ref 0.5–2)
LIDOCAIN IGE QN: 134 U/L — HIGH (ref 7–60)
LYMPHOCYTES # BLD AUTO: 14.6 % — SIGNIFICANT CHANGE UP (ref 13–44)
MAGNESIUM SERPL-MCNC: 1.4 MG/DL — LOW (ref 1.6–2.6)
MAGNESIUM SERPL-MCNC: 1.8 MG/DL — SIGNIFICANT CHANGE UP (ref 1.6–2.6)
MAGNESIUM SERPL-MCNC: 2.2 MG/DL — SIGNIFICANT CHANGE UP (ref 1.6–2.6)
MCHC RBC-ENTMCNC: 28.8 PG — SIGNIFICANT CHANGE UP (ref 27–34)
MCHC RBC-ENTMCNC: 29.2 PG — SIGNIFICANT CHANGE UP (ref 27–34)
MCHC RBC-ENTMCNC: 29.3 PG — SIGNIFICANT CHANGE UP (ref 27–34)
MCHC RBC-ENTMCNC: 30 PG — SIGNIFICANT CHANGE UP (ref 27–34)
MCHC RBC-ENTMCNC: 33.3 G/DL — SIGNIFICANT CHANGE UP (ref 32–36)
MCHC RBC-ENTMCNC: 33.8 G/DL — SIGNIFICANT CHANGE UP (ref 32–36)
MCHC RBC-ENTMCNC: 34.2 G/DL — SIGNIFICANT CHANGE UP (ref 32–36)
MCHC RBC-ENTMCNC: 34.6 G/DL — SIGNIFICANT CHANGE UP (ref 32–36)
MCV RBC AUTO: 84.5 FL — SIGNIFICANT CHANGE UP (ref 80–100)
MCV RBC AUTO: 85 FL — SIGNIFICANT CHANGE UP (ref 80–100)
MCV RBC AUTO: 87.6 FL — SIGNIFICANT CHANGE UP (ref 80–100)
MCV RBC AUTO: 87.8 FL — SIGNIFICANT CHANGE UP (ref 80–100)
METHGB MFR BLDA: 0.3 % — SIGNIFICANT CHANGE UP
MONOCYTES NFR BLD AUTO: 5.6 % — SIGNIFICANT CHANGE UP (ref 2–14)
NEUTROPHILS NFR BLD AUTO: 79.2 % — HIGH (ref 43–77)
O2 CT VFR BLDA CALC: 16.2 ML/DL — SIGNIFICANT CHANGE UP (ref 15–23)
OXYHGB MFR BLDA: 99 % — SIGNIFICANT CHANGE UP (ref 94–100)
PCO2 BLDA: 33 MMHG — SIGNIFICANT CHANGE UP (ref 32–45)
PCO2 BLDA: 35 MMHG — SIGNIFICANT CHANGE UP (ref 32–45)
PCO2 BLDV: 40 MMHG — LOW (ref 41–51)
PH BLDA: 7.38 — SIGNIFICANT CHANGE UP (ref 7.35–7.45)
PH BLDA: 7.42 — SIGNIFICANT CHANGE UP (ref 7.35–7.45)
PH BLDV: 7.39 — SIGNIFICANT CHANGE UP (ref 7.32–7.43)
PHOSPHATE SERPL-MCNC: 2.1 MG/DL — LOW (ref 2.5–4.5)
PHOSPHATE SERPL-MCNC: 2.1 MG/DL — LOW (ref 2.5–4.5)
PHOSPHATE SERPL-MCNC: 2.4 MG/DL — LOW (ref 2.5–4.5)
PLATELET # BLD AUTO: 114 K/UL — LOW (ref 150–400)
PLATELET # BLD AUTO: 119 K/UL — LOW (ref 150–400)
PLATELET # BLD AUTO: 198 K/UL — SIGNIFICANT CHANGE UP (ref 150–400)
PLATELET # BLD AUTO: 237 K/UL — SIGNIFICANT CHANGE UP (ref 150–400)
PO2 BLDA: 112 MMHG — HIGH (ref 83–108)
PO2 BLDA: 270 MMHG — HIGH (ref 83–108)
PO2 BLDV: 40 MMHG — SIGNIFICANT CHANGE UP
POTASSIUM BLDA-SCNC: 4.1 MMOL/L — SIGNIFICANT CHANGE UP (ref 3.5–4.9)
POTASSIUM BLDV-SCNC: 4.4 MMOL/L — SIGNIFICANT CHANGE UP (ref 3.5–4.9)
POTASSIUM SERPL-MCNC: 3.7 MMOL/L — SIGNIFICANT CHANGE UP (ref 3.5–5.3)
POTASSIUM SERPL-MCNC: 3.9 MMOL/L — SIGNIFICANT CHANGE UP (ref 3.5–5.3)
POTASSIUM SERPL-MCNC: 4.5 MMOL/L — SIGNIFICANT CHANGE UP (ref 3.5–5.3)
POTASSIUM SERPL-SCNC: 3.7 MMOL/L — SIGNIFICANT CHANGE UP (ref 3.5–5.3)
POTASSIUM SERPL-SCNC: 3.9 MMOL/L — SIGNIFICANT CHANGE UP (ref 3.5–5.3)
POTASSIUM SERPL-SCNC: 4.5 MMOL/L — SIGNIFICANT CHANGE UP (ref 3.5–5.3)
PROT SERPL-MCNC: 5.3 G/DL — LOW (ref 6–8.3)
PROT SERPL-MCNC: 5.9 G/DL — LOW (ref 6–8.3)
PROT SERPL-MCNC: 8.3 G/DL — SIGNIFICANT CHANGE UP (ref 6–8.3)
PROTHROM AB SERPL-ACNC: 12 SEC — SIGNIFICANT CHANGE UP (ref 9.8–12.7)
PROTHROM AB SERPL-ACNC: 12.2 SEC — SIGNIFICANT CHANGE UP (ref 9.8–12.7)
PROTHROM AB SERPL-ACNC: 12.7 SEC — SIGNIFICANT CHANGE UP (ref 9.8–12.7)
RBC # BLD: 2.91 M/UL — LOW (ref 3.8–5.2)
RBC # BLD: 3.13 M/UL — LOW (ref 3.8–5.2)
RBC # BLD: 3.35 M/UL — LOW (ref 3.8–5.2)
RBC # BLD: 4.34 M/UL — SIGNIFICANT CHANGE UP (ref 3.8–5.2)
RBC # FLD: 13 % — SIGNIFICANT CHANGE UP (ref 10.3–16.9)
RBC # FLD: 13 % — SIGNIFICANT CHANGE UP (ref 10.3–16.9)
RBC # FLD: 13.5 % — SIGNIFICANT CHANGE UP (ref 10.3–16.9)
RBC # FLD: 13.7 % — SIGNIFICANT CHANGE UP (ref 10.3–16.9)
SAO2 % BLDA: 98 % — SIGNIFICANT CHANGE UP (ref 95–100)
SAO2 % BLDA: 99 % — SIGNIFICANT CHANGE UP (ref 95–100)
SAO2 % BLDV: 75 % — SIGNIFICANT CHANGE UP
SODIUM BLDA-SCNC: 135 MMOL/L — LOW (ref 138–146)
SODIUM SERPL-SCNC: 138 MMOL/L — SIGNIFICANT CHANGE UP (ref 135–145)
SODIUM SERPL-SCNC: 140 MMOL/L — SIGNIFICANT CHANGE UP (ref 135–145)
SODIUM SERPL-SCNC: 141 MMOL/L — SIGNIFICANT CHANGE UP (ref 135–145)
TROPONIN T SERPL-MCNC: <0.01 NG/ML — SIGNIFICANT CHANGE UP (ref 0–0.01)
WBC # BLD: 10.4 K/UL — SIGNIFICANT CHANGE UP (ref 3.8–10.5)
WBC # BLD: 12.9 K/UL — HIGH (ref 3.8–10.5)
WBC # BLD: 7 K/UL — SIGNIFICANT CHANGE UP (ref 3.8–10.5)
WBC # BLD: 9.5 K/UL — SIGNIFICANT CHANGE UP (ref 3.8–10.5)
WBC # FLD AUTO: 10.4 K/UL — SIGNIFICANT CHANGE UP (ref 3.8–10.5)
WBC # FLD AUTO: 12.9 K/UL — HIGH (ref 3.8–10.5)
WBC # FLD AUTO: 7 K/UL — SIGNIFICANT CHANGE UP (ref 3.8–10.5)
WBC # FLD AUTO: 9.5 K/UL — SIGNIFICANT CHANGE UP (ref 3.8–10.5)

## 2017-11-20 PROCEDURE — 93010 ELECTROCARDIOGRAM REPORT: CPT

## 2017-11-20 PROCEDURE — 71010: CPT | Mod: 26

## 2017-11-20 PROCEDURE — 99291 CRITICAL CARE FIRST HOUR: CPT

## 2017-11-20 PROCEDURE — 47562 LAPAROSCOPIC CHOLECYSTECTOMY: CPT | Mod: GC

## 2017-11-20 RX ORDER — DEXTROSE 50 % IN WATER 50 %
1 SYRINGE (ML) INTRAVENOUS ONCE
Qty: 0 | Refills: 0 | Status: DISCONTINUED | OUTPATIENT
Start: 2017-11-20 | End: 2017-11-23

## 2017-11-20 RX ORDER — AMLODIPINE BESYLATE 2.5 MG/1
5 TABLET ORAL DAILY
Qty: 0 | Refills: 0 | Status: DISCONTINUED | OUTPATIENT
Start: 2017-11-20 | End: 2017-11-20

## 2017-11-20 RX ORDER — DEXTROSE 50 % IN WATER 50 %
25 SYRINGE (ML) INTRAVENOUS ONCE
Qty: 0 | Refills: 0 | Status: DISCONTINUED | OUTPATIENT
Start: 2017-11-20 | End: 2017-11-23

## 2017-11-20 RX ORDER — ACETAMINOPHEN 500 MG
750 TABLET ORAL ONCE
Qty: 0 | Refills: 0 | Status: DISCONTINUED | OUTPATIENT
Start: 2017-11-20 | End: 2017-11-20

## 2017-11-20 RX ORDER — HYDROMORPHONE HYDROCHLORIDE 2 MG/ML
0.5 INJECTION INTRAMUSCULAR; INTRAVENOUS; SUBCUTANEOUS EVERY 4 HOURS
Qty: 0 | Refills: 0 | Status: DISCONTINUED | OUTPATIENT
Start: 2017-11-20 | End: 2017-11-20

## 2017-11-20 RX ORDER — SODIUM CHLORIDE 9 MG/ML
1000 INJECTION INTRAMUSCULAR; INTRAVENOUS; SUBCUTANEOUS ONCE
Qty: 0 | Refills: 0 | Status: COMPLETED | OUTPATIENT
Start: 2017-11-20 | End: 2017-11-20

## 2017-11-20 RX ORDER — PANTOPRAZOLE SODIUM 20 MG/1
40 TABLET, DELAYED RELEASE ORAL DAILY
Qty: 0 | Refills: 0 | Status: DISCONTINUED | OUTPATIENT
Start: 2017-11-20 | End: 2017-11-20

## 2017-11-20 RX ORDER — HEPARIN SODIUM 5000 [USP'U]/ML
5000 INJECTION INTRAVENOUS; SUBCUTANEOUS EVERY 12 HOURS
Qty: 0 | Refills: 0 | Status: DISCONTINUED | OUTPATIENT
Start: 2017-11-20 | End: 2017-11-20

## 2017-11-20 RX ORDER — PIPERACILLIN AND TAZOBACTAM 4; .5 G/20ML; G/20ML
3.38 INJECTION, POWDER, LYOPHILIZED, FOR SOLUTION INTRAVENOUS ONCE
Qty: 0 | Refills: 0 | Status: DISCONTINUED | OUTPATIENT
Start: 2017-11-20 | End: 2017-11-20

## 2017-11-20 RX ORDER — SODIUM CHLORIDE 9 MG/ML
1000 INJECTION, SOLUTION INTRAVENOUS
Qty: 0 | Refills: 0 | Status: DISCONTINUED | OUTPATIENT
Start: 2017-11-20 | End: 2017-11-20

## 2017-11-20 RX ORDER — HYDROMORPHONE HYDROCHLORIDE 2 MG/ML
1 INJECTION INTRAMUSCULAR; INTRAVENOUS; SUBCUTANEOUS EVERY 4 HOURS
Qty: 0 | Refills: 0 | Status: DISCONTINUED | OUTPATIENT
Start: 2017-11-20 | End: 2017-11-23

## 2017-11-20 RX ORDER — SODIUM CHLORIDE 9 MG/ML
250 INJECTION INTRAMUSCULAR; INTRAVENOUS; SUBCUTANEOUS ONCE
Qty: 0 | Refills: 0 | Status: COMPLETED | OUTPATIENT
Start: 2017-11-20 | End: 2017-11-20

## 2017-11-20 RX ORDER — PIPERACILLIN AND TAZOBACTAM 4; .5 G/20ML; G/20ML
3.38 INJECTION, POWDER, LYOPHILIZED, FOR SOLUTION INTRAVENOUS EVERY 6 HOURS
Qty: 0 | Refills: 0 | Status: COMPLETED | OUTPATIENT
Start: 2017-11-20 | End: 2017-11-21

## 2017-11-20 RX ORDER — ONDANSETRON 8 MG/1
4 TABLET, FILM COATED ORAL EVERY 6 HOURS
Qty: 0 | Refills: 0 | Status: DISCONTINUED | OUTPATIENT
Start: 2017-11-20 | End: 2017-11-20

## 2017-11-20 RX ORDER — ATORVASTATIN CALCIUM 80 MG/1
40 TABLET, FILM COATED ORAL AT BEDTIME
Qty: 0 | Refills: 0 | Status: DISCONTINUED | OUTPATIENT
Start: 2017-11-20 | End: 2017-11-20

## 2017-11-20 RX ORDER — SODIUM CHLORIDE 9 MG/ML
500 INJECTION INTRAMUSCULAR; INTRAVENOUS; SUBCUTANEOUS ONCE
Qty: 0 | Refills: 0 | Status: COMPLETED | OUTPATIENT
Start: 2017-11-20 | End: 2017-11-20

## 2017-11-20 RX ORDER — HYDROMORPHONE HYDROCHLORIDE 2 MG/ML
0.5 INJECTION INTRAMUSCULAR; INTRAVENOUS; SUBCUTANEOUS EVERY 4 HOURS
Qty: 0 | Refills: 0 | Status: DISCONTINUED | OUTPATIENT
Start: 2017-11-20 | End: 2017-11-23

## 2017-11-20 RX ORDER — ACETAMINOPHEN 500 MG
750 TABLET ORAL ONCE
Qty: 0 | Refills: 0 | Status: CANCELLED | OUTPATIENT
Start: 2017-11-21 | End: 2017-11-20

## 2017-11-20 RX ORDER — DEXTROSE 50 % IN WATER 50 %
12.5 SYRINGE (ML) INTRAVENOUS ONCE
Qty: 0 | Refills: 0 | Status: DISCONTINUED | OUTPATIENT
Start: 2017-11-20 | End: 2017-11-23

## 2017-11-20 RX ORDER — INSULIN LISPRO 100/ML
VIAL (ML) SUBCUTANEOUS EVERY 6 HOURS
Qty: 0 | Refills: 0 | Status: DISCONTINUED | OUTPATIENT
Start: 2017-11-20 | End: 2017-11-24

## 2017-11-20 RX ORDER — PIPERACILLIN AND TAZOBACTAM 4; .5 G/20ML; G/20ML
3.38 INJECTION, POWDER, LYOPHILIZED, FOR SOLUTION INTRAVENOUS EVERY 6 HOURS
Qty: 0 | Refills: 0 | Status: DISCONTINUED | OUTPATIENT
Start: 2017-11-20 | End: 2017-11-20

## 2017-11-20 RX ORDER — SODIUM CHLORIDE 9 MG/ML
1000 INJECTION, SOLUTION INTRAVENOUS
Qty: 0 | Refills: 0 | Status: DISCONTINUED | OUTPATIENT
Start: 2017-11-20 | End: 2017-11-23

## 2017-11-20 RX ORDER — GLUCAGON INJECTION, SOLUTION 0.5 MG/.1ML
1 INJECTION, SOLUTION SUBCUTANEOUS ONCE
Qty: 0 | Refills: 0 | Status: DISCONTINUED | OUTPATIENT
Start: 2017-11-20 | End: 2017-11-23

## 2017-11-20 RX ORDER — SODIUM CHLORIDE 9 MG/ML
1000 INJECTION INTRAMUSCULAR; INTRAVENOUS; SUBCUTANEOUS
Qty: 0 | Refills: 0 | Status: DISCONTINUED | OUTPATIENT
Start: 2017-11-20 | End: 2017-11-22

## 2017-11-20 RX ORDER — MAGNESIUM SULFATE 500 MG/ML
2 VIAL (ML) INJECTION
Qty: 0 | Refills: 0 | Status: COMPLETED | OUTPATIENT
Start: 2017-11-20 | End: 2017-11-20

## 2017-11-20 RX ADMIN — SODIUM CHLORIDE 1000 MILLILITER(S): 9 INJECTION INTRAMUSCULAR; INTRAVENOUS; SUBCUTANEOUS at 22:06

## 2017-11-20 RX ADMIN — PIPERACILLIN AND TAZOBACTAM 200 GRAM(S): 4; .5 INJECTION, POWDER, LYOPHILIZED, FOR SOLUTION INTRAVENOUS at 01:00

## 2017-11-20 RX ADMIN — HYDROMORPHONE HYDROCHLORIDE 1 MILLIGRAM(S): 2 INJECTION INTRAMUSCULAR; INTRAVENOUS; SUBCUTANEOUS at 22:06

## 2017-11-20 RX ADMIN — Medication 2: at 22:11

## 2017-11-20 RX ADMIN — AMLODIPINE BESYLATE 5 MILLIGRAM(S): 2.5 TABLET ORAL at 06:24

## 2017-11-20 RX ADMIN — SODIUM CHLORIDE 100 MILLILITER(S): 9 INJECTION, SOLUTION INTRAVENOUS at 00:01

## 2017-11-20 RX ADMIN — HEPARIN SODIUM 5000 UNIT(S): 5000 INJECTION INTRAVENOUS; SUBCUTANEOUS at 06:24

## 2017-11-20 RX ADMIN — PIPERACILLIN AND TAZOBACTAM 200 GRAM(S): 4; .5 INJECTION, POWDER, LYOPHILIZED, FOR SOLUTION INTRAVENOUS at 06:24

## 2017-11-20 RX ADMIN — Medication 50 GRAM(S): at 22:25

## 2017-11-20 RX ADMIN — HYDROMORPHONE HYDROCHLORIDE 1 MILLIGRAM(S): 2 INJECTION INTRAMUSCULAR; INTRAVENOUS; SUBCUTANEOUS at 21:48

## 2017-11-20 RX ADMIN — Medication 50 GRAM(S): at 22:24

## 2017-11-20 RX ADMIN — SODIUM CHLORIDE 90 MILLILITER(S): 9 INJECTION INTRAMUSCULAR; INTRAVENOUS; SUBCUTANEOUS at 22:27

## 2017-11-20 RX ADMIN — SODIUM CHLORIDE 1000 MILLILITER(S): 9 INJECTION INTRAMUSCULAR; INTRAVENOUS; SUBCUTANEOUS at 12:57

## 2017-11-20 RX ADMIN — SODIUM CHLORIDE 1000 MILLILITER(S): 9 INJECTION INTRAMUSCULAR; INTRAVENOUS; SUBCUTANEOUS at 14:45

## 2017-11-20 NOTE — PROVIDER CONTACT NOTE (OTHER) - SITUATION
After IV fluid bolus completed subsequent SBP dropped to 60/37, HR 94 in NSR, RR 22 O2 Sats 100% on 2 liters nasal cannula

## 2017-11-20 NOTE — BRIEF OPERATIVE NOTE - OPERATION/FINDINGS
Patient underwent diagnostic laparoscopy for persistent hypotension in PACU after laparoscopic cholecystectomy. Gallbladder fossa with adherent clot was explored and argon beam coagulation for hemostasis. Also small branch off the right hepatic artery noticed to have sight bleeding that underwent clipping and 4-5 prolene was also used to get hemostasis. After multiple checks without insufflation no other bleeding was observed. The gallbladder fossa was then packed with Surgicel & gel foam thrombin. 10 Indonesian flat rhonda placed in subhepatic space. Patient underwent diagnostic laparoscopy for persistent hypotension in PACU after laparoscopic cholecystectomy. Gallbladder fossa with adherent clot was explored and argon beam coagulation for hemostasis. Also small branch off the right hepatic artery noticed to have sight bleeding that underwent clipping and 4-0 prolene was also used to get hemostasis. After multiple checks without insufflation no other bleeding was observed. The gallbladder fossa was then packed with Surgicel & gel foam thrombin. 10 Latvian flat rhonda placed in subhepatic space.

## 2017-11-20 NOTE — PROGRESS NOTE ADULT - SUBJECTIVE AND OBJECTIVE BOX
O/N : NPO after midnight, started on IVF. VSS. MARK  11/19: Case cancelled OR tomorrow , patient HL , started on full liquids and home meds , NPO past  midnight O/N : NPO after midnight, started on IVF. VSS. MARK  11/19: Case cancelled OR tomorrow , patient HL , started on full liquids and home meds , NPO past  midnight     SUBJECTIVE: Patient seen and examined bedside by chief resident. No nausea/vomiting, no pain.    amLODIPine   Tablet 5 milliGRAM(s) Oral daily  heparin  Injectable 5000 Unit(s) SubCutaneous every 12 hours  piperacillin/tazobactam IVPB. 3.375 Gram(s) IV Intermittent every 6 hours      Vital Signs Last 24 Hrs  T(C): 35.8 (20 Nov 2017 05:09), Max: 37.2 (19 Nov 2017 12:47)  T(F): 96.5 (20 Nov 2017 05:09), Max: 98.9 (19 Nov 2017 12:47)  HR: 59 (20 Nov 2017 05:09) (57 - 66)  BP: 133/67 (20 Nov 2017 05:09) (94/53 - 135/74)  BP(mean): --  RR: 16 (20 Nov 2017 05:09) (16 - 17)  SpO2: 96% (20 Nov 2017 05:09) (93% - 97%)  I&O's Detail    19 Nov 2017 07:01  -  20 Nov 2017 07:00  --------------------------------------------------------  IN:    lactated ringers.: 500 mL    Oral Fluid: 850 mL    Solution: 300 mL    Solution: 400 mL  Total IN: 2050 mL    OUT:    Voided: 3250 mL  Total OUT: 3250 mL    Total NET: -1200 mL          General: NAD, resting comfortably in bed  C/V: NSR  Pulm: Nonlabored breathing, no respiratory distress  Abd: soft, NT/ND.  Extrem: WWP, no edema, SCDs in place        LABS:                        11.5   8.5   )-----------( 207      ( 19 Nov 2017 07:31 )             34.5     11-19    136  |  97  |  12  ----------------------------<  81  3.4<L>   |  22  |  0.67    Ca    8.8      19 Nov 2017 07:31  Phos  3.9     11-19  Mg     1.6     11-19    TPro  7.3  /  Alb  3.0<L>  /  TBili  1.2  /  DBili  x   /  AST  81<H>  /  ALT  141<H>  /  AlkPhos  373<H>  11-19    PT/INR - ( 18 Nov 2017 18:24 )   PT: 11.2 sec;   INR: 1.01          PTT - ( 18 Nov 2017 18:24 )  PTT:32.4 sec  Urinalysis Basic - ( 18 Nov 2017 14:46 )    Color: Yellow / Appearance: SL Cloudy / SG: <=1.005 / pH: x  Gluc: x / Ketone: Trace mg/dL  / Bili: Negative / Urobili: 2.0 E.U./dL   Blood: x / Protein: NEGATIVE mg/dL / Nitrite: NEGATIVE   Leuk Esterase: Trace / RBC: x / WBC < 5 /HPF   Sq Epi: x / Non Sq Epi: Moderate /HPF / Bacteria: Present /HPF        RADIOLOGY & ADDITIONAL STUDIES:

## 2017-11-20 NOTE — BRIEF OPERATIVE NOTE - PROCEDURE
<<-----Click on this checkbox to enter Procedure Cholecystectomy, laparoscopic  11/20/2017    Active  AGODWIN

## 2017-11-20 NOTE — CONSULT NOTE ADULT - ASSESSMENT
71yo F with PMhx of DM/HTN/HLD with RUQ, possible acute cholecystitis and choledocholithiasis, s/p ERCP with stent placement, now s/p cholecystectomy c/b hypotension and Hgb drop, RTOR for diagnostic lap - oozing from gallbladder fossa and branch of R hepatic artery - hemostasis achieved.    Neuro: Dilaudid prn, IV Tylenol prn  CV: normotensive goals, NS@90  Pulm: satting well on NC  GI/FEN: NPO, NS@90  : Mauricio  ID: Zosyn for 24 hr  Endo: ISS  Heme: s/p 2 u pRBC, 1 FFP.  Will trend CBC and lactate.   PPX: SCDs  Lines: Stephanie Mao (11/20--), RUQ LEE drain

## 2017-11-20 NOTE — PROVIDER CONTACT NOTE (OTHER) - ACTION/TREATMENT ORDERED:
Ivf bolus started, cbc drawn, ephedrine given by Anesthesiologist  and unit of prbcs ordered first unit started after results of CBC and patient went back to the OR

## 2017-11-20 NOTE — PROVIDER CONTACT NOTE (OTHER) - ASSESSMENT
No obvious bleeding LEE has minimal drainage, site dressing had scant amount of blood on guaze and Lap sites dry and intact, Patient awake and alert and able to verbalize.

## 2017-11-20 NOTE — PROGRESS NOTE ADULT - ATTENDING COMMENTS
Pt was seen and examined. She feels fine. Abdominal pain improved. No pancreatitis. Needs cholecystectomy
Events noted. pt is s/p cholecystectomy. She has mild ruq discomfort  plan: as per surgery.  She will need stent to be removed in 6 weeks  Will sign off now

## 2017-11-20 NOTE — BRIEF OPERATIVE NOTE - OPERATION/FINDINGS
Patient underwent laparoscopic cholecystectomy without any complications. Cystic duct and artery identified, clipped, and then divided. Bleeding from gallbladder fossa controlled with electrocautery and FloSeal. Yannick drain left in gallbladder fossa.

## 2017-11-20 NOTE — PROGRESS NOTE ADULT - ASSESSMENT
73yo F with PMhx of DM/HTN/HLD with RUQ, possible acute cholecystitis and choledocholithiasis. No leukocytosis but left shift, MRCP suggestive of choledocholithiasis.     CBC/CMP/Coags/UA  EKG/CXR  ERCP today with   Medical clearance by   NPO/IVF LR@100cc/hr  pain and nausea control  IV Zosyn 3.375 Q6  SQH/SCD  For OR with  possibly Sunday/Monday 71yo F with PMhx of DM/HTN/HLD with RUQ, possible acute cholecystitis and choledocholithiasis. No leukocytosis but left shift, MRCP suggestive of choledocholithiasis. For lap cherrie today    Lap cholecystectomy today  NPO/IVF LR@100cc/hr  pain and nausea control  IV Zosyn 3.375 Q6  SQH/SCD

## 2017-11-20 NOTE — PRE-OP CHECKLIST - SELECT TESTS ORDERED
BMP/INR/CXR/Type and Screen/Urinalysis/CBC/PT/PTT
INR/EKG/CXR/Type and Screen/CMP/Urinalysis/PT/PTT/Type and Cross/CBC

## 2017-11-20 NOTE — PROGRESS NOTE ADULT - SUBJECTIVE AND OBJECTIVE BOX
Pt seen and examined at bedside  No new c/o  Feels better today  Planned for a lap cherrie today      MEDICATIONS:  MEDICATIONS  (STANDING):  acetaminophen  IVPB. 750 milliGRAM(s) IV Intermittent once  acetaminophen  IVPB. 750 milliGRAM(s) IV Intermittent once  amLODIPine   Tablet 5 milliGRAM(s) Oral daily  atorvastatin 40 milliGRAM(s) Oral at bedtime  heparin  Injectable 5000 Unit(s) SubCutaneous every 12 hours  lactated ringers. 1000 milliLiter(s) (100 mL/Hr) IV Continuous <Continuous>  pantoprazole  Injectable 40 milliGRAM(s) IV Push daily    MEDICATIONS  (PRN):  HYDROmorphone  Injectable 0.5 milliGRAM(s) IV Push every 4 hours PRN Moderate Pain (4 - 6)  ondansetron Injectable 4 milliGRAM(s) IV Push every 6 hours PRN Nausea and/or Vomiting      Allergies  No Known Allergies    Intolerances    Vital Signs Last 24 Hrs  T(C): 37.4 (20 Nov 2017 10:55), Max: 37.4 (20 Nov 2017 10:55)  T(F): 99.4 (20 Nov 2017 10:55), Max: 99.4 (20 Nov 2017 10:55)  HR: 62 (20 Nov 2017 11:55) (57 - 82)  BP: 114/55 (20 Nov 2017 11:55) (94/53 - 138/72)  BP(mean): 76 (20 Nov 2017 11:55) (76 - 91)  RR: 15 (20 Nov 2017 11:55) (11 - 17)  SpO2: 100% (20 Nov 2017 11:55) (93% - 100%)    11-19 @ 07:01 - 11-20 @ 07:00  --------------------------------------------------------  IN: 2050 mL / OUT: 3250 mL / NET: -1200 mL    11-20 @ 07:01 - 11-20 @ 12:14  --------------------------------------------------------  IN: 100 mL / OUT: 0 mL / NET: 100 mL      PHYSICAL EXAM:  GEN - elderly F lying in bed in no distress, anicteric, afebrile, not pale  Gastrointestinal: full, soft, BS+, NT, NR, NG, no masses or organs palpated  Neurological: AAOx3 non focal    LABS:  CBC Full  -  ( 20 Nov 2017 07:42 )  WBC Count : 7.0 K/uL  Hemoglobin : 13.0 g/dL  Hematocrit : 38.0 %  Platelet Count - Automated : 237 K/uL  Mean Cell Volume : 87.6 fL  Mean Cell Hemoglobin : 30.0 pg  Mean Cell Hemoglobin Concentration : 34.2 g/dL    11-20    140  |  98  |  10  ----------------------------<  147<H>  3.9   |  24  |  0.70    Ca    9.1      20 Nov 2017 07:42  Phos  2.1     11-20  Mg     2.2     11-20    TPro  8.3  /  Alb  3.7  /  TBili  1.1  /  DBili  x   /  AST  51<H>  /  ALT  116<H>  /  AlkPhos  344<H>  11-20    PT/INR - ( 18 Nov 2017 18:24 )   PT: 11.2 sec;   INR: 1.01        PTT - ( 18 Nov 2017 18:24 )  PTT:32.4 sec    Urinalysis Basic - ( 18 Nov 2017 14:46 )    Color: Yellow / Appearance: SL Cloudy / SG: <=1.005 / pH: x  Gluc: x / Ketone: Trace mg/dL  / Bili: Negative / Urobili: 2.0 E.U./dL   Blood: x / Protein: NEGATIVE mg/dL / Nitrite: NEGATIVE   Leuk Esterase: Trace / RBC: x / WBC < 5 /HPF   Sq Epi: x / Non Sq Epi: Moderate /HPF / Bacteria: Present /HPF      RADIOLOGY & ADDITIONAL STUDIES (The following images were personally reviewed):

## 2017-11-20 NOTE — CHART NOTE - NSCHARTNOTEFT_GEN_A_CORE
STATUS POST:  Laparoscopic cholecystectomy    SUBJECTIVE: Pt seen in PACU, was hypotensive with systolic of 76. HR stable at 60. O2 sat 100% on 2L oxygen. complain of pain at RUQ, no nausea/vomiting. LEE output is serosanguinous around 20cc with soak around the gauze. No eccymosis around the incisions/flank. Patient was given 1L bolus STAT, CBC/CMP.PT.INR.Cardiac enzymes and EKG done STAT. Epi given x2 (20mg) STAT.   Systolic came back to 110 after epi. hemodynamically stable.    Vital Signs Last 24 Hrs  T(C): 37.4 (20 Nov 2017 10:55), Max: 37.4 (20 Nov 2017 10:55)  T(F): 99.4 (20 Nov 2017 10:55), Max: 99.4 (20 Nov 2017 10:55)  HR: 64 (20 Nov 2017 13:15) (58 - 82)  BP: 110/49 (20 Nov 2017 13:15) (76/43 - 138/72)  BP(mean): 69 (20 Nov 2017 13:15) (55 - 91)  RR: 18 (20 Nov 2017 13:15) (11 - 20)  SpO2: 100% (20 Nov 2017 13:15) (93% - 100%)  I&O's Summary    19 Nov 2017 07:01  -  20 Nov 2017 07:00  --------------------------------------------------------  IN: 2050 mL / OUT: 3250 mL / NET: -1200 mL    20 Nov 2017 07:01  -  20 Nov 2017 13:34  --------------------------------------------------------  IN: 100 mL / OUT: 0 mL / NET: 100 mL      I&O's Detail    19 Nov 2017 07:01  -  20 Nov 2017 07:00  --------------------------------------------------------  IN:    lactated ringers.: 500 mL    Oral Fluid: 850 mL    Solution: 300 mL    Solution: 400 mL  Total IN: 2050 mL    OUT:    Voided: 3250 mL  Total OUT: 3250 mL    Total NET: -1200 mL      20 Nov 2017 07:01  -  20 Nov 2017 13:34  --------------------------------------------------------  IN:    lactated ringers.: 100 mL  Total IN: 100 mL    OUT:  Total OUT: 0 mL    Total NET: 100 mL            LABS:                        13.0   7.0   )-----------( 237      ( 20 Nov 2017 07:42 )             38.0     11-20    140  |  98  |  10  ----------------------------<  147<H>  3.9   |  24  |  0.70    Ca    9.1      20 Nov 2017 07:42  Phos  2.1     11-20  Mg     2.2     11-20    TPro  8.3  /  Alb  3.7  /  TBili  1.1  /  DBili  x   /  AST  51<H>  /  ALT  116<H>  /  AlkPhos  344<H>  11-20    PT/INR - ( 20 Nov 2017 13:10 )   PT: 12.2 sec;   INR: 1.10          PTT - ( 20 Nov 2017 13:10 )  PTT:44.7 sec  Urinalysis Basic - ( 18 Nov 2017 14:46 )    Color: Yellow / Appearance: SL Cloudy / SG: <=1.005 / pH: x  Gluc: x / Ketone: Trace mg/dL  / Bili: Negative / Urobili: 2.0 E.U./dL   Blood: x / Protein: NEGATIVE mg/dL / Nitrite: NEGATIVE   Leuk Esterase: Trace / RBC: x / WBC < 5 /HPF   Sq Epi: x / Non Sq Epi: Moderate /HPF / Bacteria: Present /HPF        Physical exam : Abd is soft, tender at RUQ, LEE drain intact, gauze soak. serosanguinous.        A/P: 72y Female s/p lap cholecystectomy  - Admit to tele  - Diet: Low fat CLD  - Activity: OOB/IS   - Labs: STAT  - Pain medication - IV tylenol  - Hold SQH  - Antibiotic

## 2017-11-20 NOTE — CONSULT NOTE ADULT - ATTENDING COMMENTS
This patient was evaluated at bedside with the house staff and one of the Cantonese speaking nurses.  Medical decisions were made and I agree with the A/P  -acute cholecystitis and choledocholithiasis s/p ERCP with stent now s/p cholecystectomy   -DM  >dilaudid for pain control  >tylenol  >IVF  >ICS  >EKG  >leave cochran catheter in place tonight  >CXR, VBG  >MAP 65 to 70 mmhg  >NPO, PPI  >RISS  >CBC Q2hrs  CC time 50 mins

## 2017-11-20 NOTE — CONSULT NOTE ADULT - SUBJECTIVE AND OBJECTIVE BOX
This is a 73yo F with h/o HTN,DM,HLD who p/w RUQ abdominal pain and was found to have choledocholithiasis on MRCP. PT underwent ERCP with sphincterotomy on  and subsequent laparoscopic cholecystectomy today. In PACU the pt became hypotensive with labs consistent with acute blood loss anemia (repeat Hg was 9.8 from 13.0) and the pt was taken back to OR. Pt underwent diagnostic laparoscopy with clipping of a small branch of the right hepatic artery; EBL 400cc, UO 600cc, given 1800 crystalloid, 2 pRBC, 1 FFP. Pt was transferred to SICU post-operatively for close HD monitoring. Upon arrival to SICU the pt was HD stable and without complaints. She denies CP/SOB/dizziness/N/V/abdominal pain.       PAST MEDICAL & SURGICAL HISTORY:  High cholesterol  DM (diabetes mellitus)  HTN (hypertension)  H/O total hysterectomy  History of  delivery, antepartum      ROS: See HPI    MEDICATIONS  (STANDING):  dextrose 5%. 1000 milliLiter(s) (50 mL/Hr) IV Continuous <Continuous>  dextrose 50% Injectable 12.5 Gram(s) IV Push once  dextrose 50% Injectable 25 Gram(s) IV Push once  dextrose 50% Injectable 25 Gram(s) IV Push once  insulin lispro (HumaLOG) corrective regimen sliding scale   SubCutaneous every 6 hours  piperacillin/tazobactam IVPB. 3.375 Gram(s) IV Intermittent every 6 hours  sodium chloride 0.9%. 1000 milliLiter(s) (90 mL/Hr) IV Continuous <Continuous>    MEDICATIONS  (PRN):  dextrose Gel 1 Dose(s) Oral once PRN Blood Glucose LESS THAN 70 milliGRAM(s)/deciliter  glucagon  Injectable 1 milliGRAM(s) IntraMuscular once PRN Glucose LESS THAN 70 milligrams/deciliter  HYDROmorphone  Injectable 0.5 milliGRAM(s) IV Push every 4 hours PRN Moderate Pain (4 - 6)  HYDROmorphone  Injectable 1 milliGRAM(s) IV Push every 4 hours PRN Severe Pain (7 - 10)      Allergies    No Known Allergies    Intolerances        SOCIAL HISTORY:  Smoke: Never Smoker  EtOH: occasional    FAMILY HISTORY: na      Vital Signs Last 24 Hrs  T(C): 36.8 (2017 19:50), Max: 37.4 (2017 10:55)  T(F): 98.3 (2017 19:50), Max: 99.4 (2017 10:55)  HR: 98 (2017 20:00) (58 - 122)  BP: 119/59 (2017 20:00) (60/37 - 138/72)  BP(mean): 77 (2017 20:00) (42 - 91)  RR: 15 (2017 20:00) (11 - 27)  SpO2: 100% (2017 20:00) (96% - 100%)    PHYSICAL EXAM    Gen: NAD, resting comfortably in bed  Neuro: A&Ox3, no focal deficits  HEENT: MMM, No JVD, no scleral icterus.   CV: tachycardic, regular, no murmur  Pulm: CTAx2  Abd: Soft, distended, ATTP at lap sites and drain site. No guarding or rebound. Incisions are CDI. LEE drain with mostly sanguinous ouput, dressing blood stained.   : cochran draining clear urine  Ext: WWP, 2+DP pulses b/l. No edema  Skin: no rash  Psyc: appropriate affect    LABS:                        9.0    10.4  )-----------( 119      ( 2017 20:04 )             26.6     11-20    141  |  106  |  7   ----------------------------<  179<H>  4.5   |  24  |  0.64    Ca    8.1<L>      2017 13:09  Phos  2.1     20  Mg     1.8         TPro  5.9<L>  /  Alb  2.7<L>  /  TBili  0.7  /  DBili  x   /  AST  76<H>  /  ALT  97<H>  /  AlkPhos  230<H>  11-20    PT/INR - ( 2017 20:04 )   PT: 12.0 sec;   INR: 1.08          PTT - ( 2017 20:04 )  PTT:35.8 sec

## 2017-11-20 NOTE — BRIEF OPERATIVE NOTE - PROCEDURE
<<-----Click on this checkbox to enter Procedure Diagnostic laparoscopy  11/20/2017    Active  AGODWIN

## 2017-11-20 NOTE — PROGRESS NOTE ADULT - ASSESSMENT
72yr old F with PMHx significant for HTN, DM, Dyslipidemia, sp  in  and sp TAHBSO 3 years ago for benign ovarian cyst who presents to the ED for evaluation of abdominal pain for 2 weeks.    #Choledocholithiasis -   - sp ERCP 17 - with multiple stones in CBD and some pus suggestive of cholangitis, sphincterotomy, and double pigtail stent placement  - continue with IV abx  - monitor abdominal exams - for post ERCP pancreatitis - received indomethacin post ERCP and lipase level not 3xULN  - pain management per primary team  - LFTs downtrending   - interval lap cherrie after ERCP      Discussed with attending Dr Torres  GI will sign off for now 72yr old F with PMHx significant for HTN, DM, Dyslipidemia, sp  in  and sp TAHBSO 3 years ago for benign ovarian cyst who presents to the ED for evaluation of abdominal pain for 2 weeks.    #Choledocholithiasis -   - sp ERCP 17 - with multiple stones in CBD and some pus suggestive of cholangitis, sphincterotomy, and double pigtail stent placement  - continue with IV abx  - pain management per primary team  - LFTs downtrending         Discussed with attending Dr Torres  GI will sign off for now 72yr old F with PMHx significant for HTN, DM, Dyslipidemia, sp  in  and sp TAHBSO 3 years ago for benign ovarian cyst who presents to the ED for evaluation of abdominal pain for 2 weeks.    #Choledocholithiasis -   - sp ERCP 17 - with multiple stones in CBD and some pus suggestive of cholangitis, sphincterotomy, and double pigtail stent placement  - continue with IV abx  - pain management per primary team  - LFTs downtrending   - plan for stent removal in 6 weeks      Discussed with attending Dr Torres  GI will sign off for now

## 2017-11-21 LAB
ALBUMIN SERPL ELPH-MCNC: 2.2 G/DL — LOW (ref 3.3–5)
ALBUMIN SERPL ELPH-MCNC: 2.8 G/DL — LOW (ref 3.3–5)
ALP SERPL-CCNC: 85 U/L — SIGNIFICANT CHANGE UP (ref 40–120)
ALP SERPL-CCNC: 94 U/L — SIGNIFICANT CHANGE UP (ref 40–120)
ALT FLD-CCNC: 55 U/L — HIGH (ref 10–45)
ALT FLD-CCNC: 62 U/L — HIGH (ref 10–45)
ANION GAP SERPL CALC-SCNC: 11 MMOL/L — SIGNIFICANT CHANGE UP (ref 5–17)
ANION GAP SERPL CALC-SCNC: 14 MMOL/L — SIGNIFICANT CHANGE UP (ref 5–17)
APTT BLD: 33.3 SEC — SIGNIFICANT CHANGE UP (ref 27.5–37.4)
AST SERPL-CCNC: 58 U/L — HIGH (ref 10–40)
AST SERPL-CCNC: 76 U/L — HIGH (ref 10–40)
BILIRUB SERPL-MCNC: 1.9 MG/DL — HIGH (ref 0.2–1.2)
BILIRUB SERPL-MCNC: 2.4 MG/DL — HIGH (ref 0.2–1.2)
BUN SERPL-MCNC: 10 MG/DL — SIGNIFICANT CHANGE UP (ref 7–23)
BUN SERPL-MCNC: 10 MG/DL — SIGNIFICANT CHANGE UP (ref 7–23)
CALCIUM SERPL-MCNC: 8.2 MG/DL — LOW (ref 8.4–10.5)
CALCIUM SERPL-MCNC: 9.5 MG/DL — SIGNIFICANT CHANGE UP (ref 8.4–10.5)
CHLORIDE SERPL-SCNC: 107 MMOL/L — SIGNIFICANT CHANGE UP (ref 96–108)
CHLORIDE SERPL-SCNC: 108 MMOL/L — SIGNIFICANT CHANGE UP (ref 96–108)
CO2 SERPL-SCNC: 21 MMOL/L — LOW (ref 22–31)
CO2 SERPL-SCNC: 23 MMOL/L — SIGNIFICANT CHANGE UP (ref 22–31)
COHGB MFR BLDA: 1.8 % — HIGH
CREAT SERPL-MCNC: 0.72 MG/DL — SIGNIFICANT CHANGE UP (ref 0.5–1.3)
CREAT SERPL-MCNC: 0.73 MG/DL — SIGNIFICANT CHANGE UP (ref 0.5–1.3)
GAS PNL BLDA: SIGNIFICANT CHANGE UP
GAS PNL BLDA: SIGNIFICANT CHANGE UP
GLUCOSE BLDC GLUCOMTR-MCNC: 113 MG/DL — HIGH (ref 70–99)
GLUCOSE BLDC GLUCOMTR-MCNC: 115 MG/DL — HIGH (ref 70–99)
GLUCOSE BLDC GLUCOMTR-MCNC: 115 MG/DL — HIGH (ref 70–99)
GLUCOSE BLDC GLUCOMTR-MCNC: 125 MG/DL — HIGH (ref 70–99)
GLUCOSE BLDC GLUCOMTR-MCNC: 162 MG/DL — HIGH (ref 70–99)
GLUCOSE SERPL-MCNC: 142 MG/DL — HIGH (ref 70–99)
GLUCOSE SERPL-MCNC: 149 MG/DL — HIGH (ref 70–99)
HBA1C BLD-MCNC: 5.3 % — SIGNIFICANT CHANGE UP (ref 4–5.6)
HCO3 BLDA-SCNC: 21 MMOL/L — SIGNIFICANT CHANGE UP (ref 21–28)
HCT VFR BLD CALC: 20.4 % — CRITICAL LOW (ref 34.5–45)
HCT VFR BLD CALC: 20.7 % — CRITICAL LOW (ref 34.5–45)
HCT VFR BLD CALC: 25.8 % — LOW (ref 34.5–45)
HCT VFR BLD CALC: 25.8 % — LOW (ref 34.5–45)
HCT VFR BLD CALC: 31 % — LOW (ref 34.5–45)
HGB BLD-MCNC: 10.8 G/DL — LOW (ref 11.5–15.5)
HGB BLD-MCNC: 7.1 G/DL — LOW (ref 11.5–15.5)
HGB BLD-MCNC: 7.3 G/DL — LOW (ref 11.5–15.5)
HGB BLD-MCNC: 9 G/DL — LOW (ref 11.5–15.5)
HGB BLD-MCNC: 9.4 G/DL — LOW (ref 11.5–15.5)
HGB BLDA-MCNC: 6.7 G/DL — CRITICAL LOW (ref 11.5–15.5)
INR BLD: 1.03 — SIGNIFICANT CHANGE UP (ref 0.88–1.16)
LACTATE SERPL-SCNC: 0.8 MMOL/L — SIGNIFICANT CHANGE UP (ref 0.5–2)
LACTATE SERPL-SCNC: 1.7 MMOL/L — SIGNIFICANT CHANGE UP (ref 0.5–2)
LACTATE SERPL-SCNC: 2 MMOL/L — SIGNIFICANT CHANGE UP (ref 0.5–2)
LACTATE SERPL-SCNC: 4.4 MMOL/L — CRITICAL HIGH (ref 0.5–2)
MAGNESIUM SERPL-MCNC: 2.5 MG/DL — SIGNIFICANT CHANGE UP (ref 1.6–2.6)
MCHC RBC-ENTMCNC: 29.5 PG — SIGNIFICANT CHANGE UP (ref 27–34)
MCHC RBC-ENTMCNC: 29.5 PG — SIGNIFICANT CHANGE UP (ref 27–34)
MCHC RBC-ENTMCNC: 29.7 PG — SIGNIFICANT CHANGE UP (ref 27–34)
MCHC RBC-ENTMCNC: 30.2 PG — SIGNIFICANT CHANGE UP (ref 27–34)
MCHC RBC-ENTMCNC: 30.2 PG — SIGNIFICANT CHANGE UP (ref 27–34)
MCHC RBC-ENTMCNC: 34.8 G/DL — SIGNIFICANT CHANGE UP (ref 32–36)
MCHC RBC-ENTMCNC: 34.8 G/DL — SIGNIFICANT CHANGE UP (ref 32–36)
MCHC RBC-ENTMCNC: 34.9 G/DL — SIGNIFICANT CHANGE UP (ref 32–36)
MCHC RBC-ENTMCNC: 35.3 G/DL — SIGNIFICANT CHANGE UP (ref 32–36)
MCHC RBC-ENTMCNC: 36.4 G/DL — HIGH (ref 32–36)
MCV RBC AUTO: 83 FL — SIGNIFICANT CHANGE UP (ref 80–100)
MCV RBC AUTO: 84.6 FL — SIGNIFICANT CHANGE UP (ref 80–100)
MCV RBC AUTO: 84.6 FL — SIGNIFICANT CHANGE UP (ref 80–100)
MCV RBC AUTO: 85.2 FL — SIGNIFICANT CHANGE UP (ref 80–100)
MCV RBC AUTO: 85.5 FL — SIGNIFICANT CHANGE UP (ref 80–100)
METHGB MFR BLDA: 0.3 % — SIGNIFICANT CHANGE UP
O2 CT VFR BLDA CALC: SIGNIFICANT CHANGE UP (ref 15–23)
OXYHGB MFR BLDA: 96 % — SIGNIFICANT CHANGE UP (ref 94–100)
PCO2 BLDA: 38 MMHG — SIGNIFICANT CHANGE UP (ref 32–45)
PH BLDA: 7.36 — SIGNIFICANT CHANGE UP (ref 7.35–7.45)
PHOSPHATE SERPL-MCNC: 4.5 MG/DL — SIGNIFICANT CHANGE UP (ref 2.5–4.5)
PLATELET # BLD AUTO: 122 K/UL — LOW (ref 150–400)
PLATELET # BLD AUTO: 143 K/UL — LOW (ref 150–400)
PLATELET # BLD AUTO: 168 K/UL — SIGNIFICANT CHANGE UP (ref 150–400)
PLATELET # BLD AUTO: 184 K/UL — SIGNIFICANT CHANGE UP (ref 150–400)
PLATELET # BLD AUTO: 91 K/UL — LOW (ref 150–400)
PO2 BLDA: 114 MMHG — HIGH (ref 83–108)
POTASSIUM SERPL-MCNC: 3.5 MMOL/L — SIGNIFICANT CHANGE UP (ref 3.5–5.3)
POTASSIUM SERPL-MCNC: 4.3 MMOL/L — SIGNIFICANT CHANGE UP (ref 3.5–5.3)
POTASSIUM SERPL-SCNC: 3.5 MMOL/L — SIGNIFICANT CHANGE UP (ref 3.5–5.3)
POTASSIUM SERPL-SCNC: 4.3 MMOL/L — SIGNIFICANT CHANGE UP (ref 3.5–5.3)
PROT SERPL-MCNC: 4.9 G/DL — LOW (ref 6–8.3)
PROT SERPL-MCNC: 5.4 G/DL — LOW (ref 6–8.3)
PROTHROM AB SERPL-ACNC: 11.4 SEC — SIGNIFICANT CHANGE UP (ref 9.8–12.7)
RBC # BLD: 2.41 M/UL — LOW (ref 3.8–5.2)
RBC # BLD: 2.42 M/UL — LOW (ref 3.8–5.2)
RBC # BLD: 3.05 M/UL — LOW (ref 3.8–5.2)
RBC # BLD: 3.11 M/UL — LOW (ref 3.8–5.2)
RBC # BLD: 3.64 M/UL — LOW (ref 3.8–5.2)
RBC # FLD: 13.7 % — SIGNIFICANT CHANGE UP (ref 10.3–16.9)
RBC # FLD: 13.8 % — SIGNIFICANT CHANGE UP (ref 10.3–16.9)
RBC # FLD: 14 % — SIGNIFICANT CHANGE UP (ref 10.3–16.9)
RBC # FLD: 14.1 % — SIGNIFICANT CHANGE UP (ref 10.3–16.9)
RBC # FLD: 14.3 % — SIGNIFICANT CHANGE UP (ref 10.3–16.9)
SAO2 % BLDA: 98 % — SIGNIFICANT CHANGE UP (ref 95–100)
SODIUM SERPL-SCNC: 141 MMOL/L — SIGNIFICANT CHANGE UP (ref 135–145)
SODIUM SERPL-SCNC: 143 MMOL/L — SIGNIFICANT CHANGE UP (ref 135–145)
WBC # BLD: 7.1 K/UL — SIGNIFICANT CHANGE UP (ref 3.8–10.5)
WBC # BLD: 7.7 K/UL — SIGNIFICANT CHANGE UP (ref 3.8–10.5)
WBC # BLD: 8.7 K/UL — SIGNIFICANT CHANGE UP (ref 3.8–10.5)
WBC # BLD: 8.8 K/UL — SIGNIFICANT CHANGE UP (ref 3.8–10.5)
WBC # BLD: 9 K/UL — SIGNIFICANT CHANGE UP (ref 3.8–10.5)
WBC # FLD AUTO: 7.1 K/UL — SIGNIFICANT CHANGE UP (ref 3.8–10.5)
WBC # FLD AUTO: 7.7 K/UL — SIGNIFICANT CHANGE UP (ref 3.8–10.5)
WBC # FLD AUTO: 8.7 K/UL — SIGNIFICANT CHANGE UP (ref 3.8–10.5)
WBC # FLD AUTO: 8.8 K/UL — SIGNIFICANT CHANGE UP (ref 3.8–10.5)
WBC # FLD AUTO: 9 K/UL — SIGNIFICANT CHANGE UP (ref 3.8–10.5)

## 2017-11-21 RX ORDER — POTASSIUM CHLORIDE 20 MEQ
10 PACKET (EA) ORAL
Qty: 0 | Refills: 0 | Status: COMPLETED | OUTPATIENT
Start: 2017-11-21 | End: 2017-11-21

## 2017-11-21 RX ORDER — DIPHENHYDRAMINE HCL 50 MG
25 CAPSULE ORAL EVERY 6 HOURS
Qty: 0 | Refills: 0 | Status: DISCONTINUED | OUTPATIENT
Start: 2017-11-21 | End: 2017-11-25

## 2017-11-21 RX ORDER — HYDROMORPHONE HYDROCHLORIDE 2 MG/ML
0.5 INJECTION INTRAMUSCULAR; INTRAVENOUS; SUBCUTANEOUS ONCE
Qty: 0 | Refills: 0 | Status: DISCONTINUED | OUTPATIENT
Start: 2017-11-21 | End: 2017-11-21

## 2017-11-21 RX ORDER — ONDANSETRON 8 MG/1
4 TABLET, FILM COATED ORAL EVERY 6 HOURS
Qty: 0 | Refills: 0 | Status: DISCONTINUED | OUTPATIENT
Start: 2017-11-21 | End: 2017-11-25

## 2017-11-21 RX ORDER — DESMOPRESSIN ACETATE 0.1 MG/1
14 TABLET ORAL ONCE
Qty: 0 | Refills: 0 | Status: COMPLETED | OUTPATIENT
Start: 2017-11-21 | End: 2017-11-21

## 2017-11-21 RX ORDER — DIPHENHYDRAMINE HCL 50 MG
25 CAPSULE ORAL ONCE
Qty: 0 | Refills: 0 | Status: COMPLETED | OUTPATIENT
Start: 2017-11-21 | End: 2017-11-21

## 2017-11-21 RX ORDER — SODIUM CHLORIDE 9 MG/ML
500 INJECTION INTRAMUSCULAR; INTRAVENOUS; SUBCUTANEOUS ONCE
Qty: 0 | Refills: 0 | Status: COMPLETED | OUTPATIENT
Start: 2017-11-20 | End: 2017-11-21

## 2017-11-21 RX ORDER — SODIUM CHLORIDE 9 MG/ML
500 INJECTION INTRAMUSCULAR; INTRAVENOUS; SUBCUTANEOUS ONCE
Qty: 0 | Refills: 0 | Status: DISCONTINUED | OUTPATIENT
Start: 2017-11-21 | End: 2017-11-22

## 2017-11-21 RX ORDER — LABETALOL HCL 100 MG
10 TABLET ORAL ONCE
Qty: 0 | Refills: 0 | Status: COMPLETED | OUTPATIENT
Start: 2017-11-21 | End: 2017-11-21

## 2017-11-21 RX ORDER — SODIUM CHLORIDE 9 MG/ML
500 INJECTION INTRAMUSCULAR; INTRAVENOUS; SUBCUTANEOUS ONCE
Qty: 0 | Refills: 0 | Status: COMPLETED | OUTPATIENT
Start: 2017-11-21 | End: 2017-11-21

## 2017-11-21 RX ORDER — BUPIVACAINE 13.3 MG/ML
20 INJECTION, SUSPENSION, LIPOSOMAL INFILTRATION ONCE
Qty: 0 | Refills: 0 | Status: COMPLETED | OUTPATIENT
Start: 2017-11-21 | End: 2017-11-21

## 2017-11-21 RX ADMIN — PIPERACILLIN AND TAZOBACTAM 200 GRAM(S): 4; .5 INJECTION, POWDER, LYOPHILIZED, FOR SOLUTION INTRAVENOUS at 01:17

## 2017-11-21 RX ADMIN — Medication 10 MILLIGRAM(S): at 07:01

## 2017-11-21 RX ADMIN — Medication 100 MILLIEQUIVALENT(S): at 17:30

## 2017-11-21 RX ADMIN — HYDROMORPHONE HYDROCHLORIDE 0.5 MILLIGRAM(S): 2 INJECTION INTRAMUSCULAR; INTRAVENOUS; SUBCUTANEOUS at 18:48

## 2017-11-21 RX ADMIN — HYDROMORPHONE HYDROCHLORIDE 0.5 MILLIGRAM(S): 2 INJECTION INTRAMUSCULAR; INTRAVENOUS; SUBCUTANEOUS at 00:57

## 2017-11-21 RX ADMIN — Medication 25 MILLIGRAM(S): at 06:59

## 2017-11-21 RX ADMIN — Medication 100 MILLIEQUIVALENT(S): at 15:55

## 2017-11-21 RX ADMIN — HYDROMORPHONE HYDROCHLORIDE 0.5 MILLIGRAM(S): 2 INJECTION INTRAMUSCULAR; INTRAVENOUS; SUBCUTANEOUS at 15:00

## 2017-11-21 RX ADMIN — ONDANSETRON 4 MILLIGRAM(S): 8 TABLET, FILM COATED ORAL at 00:19

## 2017-11-21 RX ADMIN — PIPERACILLIN AND TAZOBACTAM 200 GRAM(S): 4; .5 INJECTION, POWDER, LYOPHILIZED, FOR SOLUTION INTRAVENOUS at 17:07

## 2017-11-21 RX ADMIN — PIPERACILLIN AND TAZOBACTAM 200 GRAM(S): 4; .5 INJECTION, POWDER, LYOPHILIZED, FOR SOLUTION INTRAVENOUS at 11:23

## 2017-11-21 RX ADMIN — HYDROMORPHONE HYDROCHLORIDE 0.5 MILLIGRAM(S): 2 INJECTION INTRAMUSCULAR; INTRAVENOUS; SUBCUTANEOUS at 14:37

## 2017-11-21 RX ADMIN — HYDROMORPHONE HYDROCHLORIDE 0.5 MILLIGRAM(S): 2 INJECTION INTRAMUSCULAR; INTRAVENOUS; SUBCUTANEOUS at 01:35

## 2017-11-21 RX ADMIN — Medication 25 MILLIGRAM(S): at 11:34

## 2017-11-21 RX ADMIN — SODIUM CHLORIDE 1000 MILLILITER(S): 9 INJECTION INTRAMUSCULAR; INTRAVENOUS; SUBCUTANEOUS at 00:12

## 2017-11-21 RX ADMIN — HYDROMORPHONE HYDROCHLORIDE 0.5 MILLIGRAM(S): 2 INJECTION INTRAMUSCULAR; INTRAVENOUS; SUBCUTANEOUS at 09:23

## 2017-11-21 RX ADMIN — SODIUM CHLORIDE 3000 MILLILITER(S): 9 INJECTION INTRAMUSCULAR; INTRAVENOUS; SUBCUTANEOUS at 08:30

## 2017-11-21 RX ADMIN — Medication 100 MILLIEQUIVALENT(S): at 19:13

## 2017-11-21 RX ADMIN — PIPERACILLIN AND TAZOBACTAM 200 GRAM(S): 4; .5 INJECTION, POWDER, LYOPHILIZED, FOR SOLUTION INTRAVENOUS at 07:28

## 2017-11-21 RX ADMIN — HYDROMORPHONE HYDROCHLORIDE 0.5 MILLIGRAM(S): 2 INJECTION INTRAMUSCULAR; INTRAVENOUS; SUBCUTANEOUS at 09:50

## 2017-11-21 RX ADMIN — SODIUM CHLORIDE 3000 MILLILITER(S): 9 INJECTION INTRAMUSCULAR; INTRAVENOUS; SUBCUTANEOUS at 17:26

## 2017-11-21 RX ADMIN — SODIUM CHLORIDE 90 MILLILITER(S): 9 INJECTION INTRAMUSCULAR; INTRAVENOUS; SUBCUTANEOUS at 15:58

## 2017-11-21 RX ADMIN — HYDROMORPHONE HYDROCHLORIDE 0.5 MILLIGRAM(S): 2 INJECTION INTRAMUSCULAR; INTRAVENOUS; SUBCUTANEOUS at 19:14

## 2017-11-21 NOTE — BRIEF OPERATIVE NOTE - PRE-OP DX
Choledocholithiasis with acute cholecystitis with obstruction  11/20/2017    Clifton Brandt  Hypotension due to blood loss  11/20/2017    Clifton Brandt
Choledocholithiasis with acute cholecystitis with obstruction  11/20/2017    Clifton Brandt  Hypotension due to blood loss  11/20/2017    Clifton Brandt
Choledocholithiasis with acute cholecystitis with obstruction  11/20/2017    Active  Clifton Fisher

## 2017-11-21 NOTE — BRIEF OPERATIVE NOTE - BRIEF OP NOTE DRAINS
10 Mongolian flat rhonda in gallbladder fossa
19Fr Tom drain
10 Occitan flat rhonda placed in subhepatic space

## 2017-11-21 NOTE — BRIEF OPERATIVE NOTE - PROCEDURE
<<-----Click on this checkbox to enter Procedure Exploratory laparotomy  11/21/2017    Active  NALPER

## 2017-11-21 NOTE — PROGRESS NOTE ADULT - ASSESSMENT
72yoF with PMH DM, HTN, HLD admitted with cholecystitis s/p ERCP/sphincterotomy and laparoscopic cholecystectomy c/b persistent bleeding s/p RTOR x2 and ligation of hepatic arterial branch (11/20-21)    Neuro: Dilaudid prn, IV Tylenol prn  CV: normotensive goals  Pulm: NC PRN  GI/FEN: NPO, NS@90  : Mauricio  ID: Zosyn for 24 hrs  Endo: ISS. IV Benadryl for pruritic rash/hives.  Heme: Holding SQH. s/p 5U PRBC, 3 FFP, 1PLT  PPX: SCDs

## 2017-11-21 NOTE — BRIEF OPERATIVE NOTE - OPERATION/FINDINGS
Subcostal incision created, large amount of clotted blood in RUQ, evacuated. Point of active bleeding noted from area near hepatic artery, controlled with 1 figure-of-eight 5-O prolene suture. LEE drain left.

## 2017-11-21 NOTE — DIETITIAN INITIAL EVALUATION ADULT. - ENERGY NEEDS
50 kg ABW; 45 kg IBW; 111% of IBW; BMI 22.  ABW used due to pt between % of IBW.   Needs increased s/p surgery

## 2017-11-21 NOTE — DIETITIAN INITIAL EVALUATION ADULT. - OTHER INFO
72yoF with PMH DM, HTN, HLD admitted with cholecystitis s/p ERCP/sphincterotomy and laparoscopic cholecystectomy c/b persistent bleeding s/p RTOR x2 and ligation of hepatic arterial branch (11/20-21). Currently NPO this AM.

## 2017-11-21 NOTE — PROGRESS NOTE ADULT - SUBJECTIVE AND OBJECTIVE BOX
INTERVAL HPI/OVERNIGHT EVENTS:  Overnight, patient was taken back to the OR x2 for persistent bleeding after laparoscopic cholecystectomy. Small branch of hepatic artery identified as likely source of bleed, oversewn with prolene suture to achieve hemostasis. Transferred back to SICU post-operatively early this AM    Pt HTNsive this AM, possibly partially pain-related. Also reports some pruritis, associated with generalized rash. No SOB, denies pain, though only when not moving,    MEDICATIONS  (STANDING):  insulin lispro (HumaLOG) corrective regimen sliding scale   SubCutaneous every 6 hours  piperacillin/tazobactam IVPB. 3.375 Gram(s) IV Intermittent every 6 hours  sodium chloride 0.9% Bolus 500 milliLiter(s) IV Bolus once  sodium chloride 0.9%. 1000 milliLiter(s) (90 mL/Hr) IV Continuous <Continuous>    MEDICATIONS  (PRN):  dextrose Gel 1 Dose(s) Oral once PRN Blood Glucose LESS THAN 70 milliGRAM(s)/deciliter  glucagon  Injectable 1 milliGRAM(s) IntraMuscular once PRN Glucose LESS THAN 70 milligrams/deciliter  HYDROmorphone  Injectable 0.5 milliGRAM(s) IV Push every 4 hours PRN Moderate Pain (4 - 6)  HYDROmorphone  Injectable 1 milliGRAM(s) IV Push every 4 hours PRN Severe Pain (7 - 10)  ondansetron Injectable 4 milliGRAM(s) IV Push every 6 hours PRN Nausea and/or Vomiting      Drug Dosing Weight  Height (cm): 152.4 (2017 02:01)  Weight (kg): 49.9 (2017 02:01)  BMI (kg/m2): 21.5 (2017 02:01)  BSA (m2): 1.45 (2017 02:01)    PAST MEDICAL & SURGICAL HISTORY:  High cholesterol  DM (diabetes mellitus)  HTN (hypertension)  H/O total hysterectomy  History of  delivery, antepartum      ICU Vital Signs Last 24 Hrs  T(C): 35.8 (2017 02:00), Max: 37.4 (2017 10:55)  T(F): 96.4 (2017 02:00), Max: 99.4 (2017 10:55)  HR: 106 (2017 07:00) (58 - 128)  BP: 140/66 (2017 07:00) (60/37 - 174/83)  BP(mean): 98 (2017 07:00) (42 - 126)  ABP: 154/74 (2017 07:00) (74/46 - 186/82)  ABP(mean): 102 (2017 07:00) (58 - 122)  RR: 15 (2017 07:00) (3 - 27)  SpO2: 99% (2017 07:00) (93% - 100%)      ABG - ( 2017 06:45 )  pH: 7.34  /  pCO2: 43    /  pO2: 73    / HCO3: 23    / Base Excess: -3.1  /  SaO2: 94                    2017 07:01  -  2017 07:00  --------------------------------------------------------  IN:    FFP (Fresh Frozen Plasma): 300 mL    lactated ringers.: 100 mL    Packed Red Blood Cells: 350 mL    sodium chloride 0.9%.: 720 mL    Solution: 1250 mL  Total IN: 2720 mL    OUT:    Bulb: 155 mL    Indwelling Catheter - Urethral: 400 mL  Total OUT: 555 mL    Total NET: 2165 mL              PHYSICAL EXAM:      Constitutional: NAD, resting comfortably in bed, very still. Well developed, well groomed.  Eyes: PERRL, EOMI  ENMT: MMD  Respiratory: CTAB, no respiratory distress, nonlabored breathing on RA  Cardiovascular: RRR, no MRG  Gastrointestinal: abdomen mildly distended, tender to palpation with associated guarding. No rebound. Large right subcostal incision CDI. Multiple lap incisions also CDI. RUQ LEE with sanguinous output, pain with stripping. No BS  Genitourinary: Mauricio draining clear yellow urine  Extremities: WWP, no edema, no cyanosis  Vascular: palpable radial and PT  Neurological: AAOx3, CN II-XII grossly intact  Skin: warm, dry. Reticular maculopapular erythematous rash apparent across face, neck, abdomen, pelvis, nonblanching.  Musculoskeletal: no joint swelling  Psychiatric: appropriate affect,         LABS:  CBC Full  -  ( 2017 02:27 )  WBC Count : 7.7 K/uL  Hemoglobin : 7.3 g/dL  Hematocrit : 20.7 %  Platelet Count - Automated : 91 K/uL  Mean Cell Volume : 85.5 fL  Mean Cell Hemoglobin : 30.2 pg  Mean Cell Hemoglobin Concentration : 35.3 g/dL        138  |  104  |  7   ----------------------------<  203<H>  3.7   |  21<L>  |  0.59    Ca    7.5<L>      2017 20:04  Phos  2.4       Mg     1.4         TPro  5.3<L>  /  Alb  2.4<L>  /  TBili  1.5<H>  /  DBili  x   /  AST  102<H>  /  ALT  93<H>  /  AlkPhos  150<H>      PT/INR - ( 2017 20:04 )   PT: 12.0 sec;   INR: 1.08          PTT - ( 2017 20:04 )  PTT:35.8 sec      RADIOLOGY & ADDITIONAL STUDIES:

## 2017-11-22 LAB
ALBUMIN SERPL ELPH-MCNC: 2.4 G/DL — LOW (ref 3.3–5)
ALP SERPL-CCNC: 87 U/L — SIGNIFICANT CHANGE UP (ref 40–120)
ALT FLD-CCNC: 53 U/L — HIGH (ref 10–45)
ANION GAP SERPL CALC-SCNC: 11 MMOL/L — SIGNIFICANT CHANGE UP (ref 5–17)
APTT BLD: 30.4 SEC — SIGNIFICANT CHANGE UP (ref 27.5–37.4)
AST SERPL-CCNC: 53 U/L — HIGH (ref 10–40)
BILIRUB DIRECT SERPL-MCNC: 0.4 MG/DL — HIGH (ref 0–0.2)
BILIRUB INDIRECT FLD-MCNC: 0.6 MG/DL — SIGNIFICANT CHANGE UP (ref 0.2–1)
BILIRUB SERPL-MCNC: 1 MG/DL — SIGNIFICANT CHANGE UP (ref 0.2–1.2)
BUN SERPL-MCNC: 7 MG/DL — SIGNIFICANT CHANGE UP (ref 7–23)
CALCIUM SERPL-MCNC: 7.6 MG/DL — LOW (ref 8.4–10.5)
CHLORIDE SERPL-SCNC: 104 MMOL/L — SIGNIFICANT CHANGE UP (ref 96–108)
CO2 SERPL-SCNC: 22 MMOL/L — SIGNIFICANT CHANGE UP (ref 22–31)
CREAT SERPL-MCNC: 0.65 MG/DL — SIGNIFICANT CHANGE UP (ref 0.5–1.3)
GLUCOSE BLDC GLUCOMTR-MCNC: 100 MG/DL — HIGH (ref 70–99)
GLUCOSE BLDC GLUCOMTR-MCNC: 105 MG/DL — HIGH (ref 70–99)
GLUCOSE BLDC GLUCOMTR-MCNC: 92 MG/DL — SIGNIFICANT CHANGE UP (ref 70–99)
GLUCOSE BLDC GLUCOMTR-MCNC: 97 MG/DL — SIGNIFICANT CHANGE UP (ref 70–99)
GLUCOSE SERPL-MCNC: 102 MG/DL — HIGH (ref 70–99)
HCT VFR BLD CALC: 23.1 % — LOW (ref 34.5–45)
HCT VFR BLD CALC: 23.2 % — LOW (ref 34.5–45)
HCT VFR BLD CALC: 23.7 % — LOW (ref 34.5–45)
HGB BLD-MCNC: 8 G/DL — LOW (ref 11.5–15.5)
HGB BLD-MCNC: 8.4 G/DL — LOW (ref 11.5–15.5)
HGB BLD-MCNC: 8.4 G/DL — LOW (ref 11.5–15.5)
INR BLD: 1.11 — SIGNIFICANT CHANGE UP (ref 0.88–1.16)
MAGNESIUM SERPL-MCNC: 1.7 MG/DL — SIGNIFICANT CHANGE UP (ref 1.6–2.6)
MCHC RBC-ENTMCNC: 29.7 PG — SIGNIFICANT CHANGE UP (ref 27–34)
MCHC RBC-ENTMCNC: 30.3 PG — SIGNIFICANT CHANGE UP (ref 27–34)
MCHC RBC-ENTMCNC: 30.8 PG — SIGNIFICANT CHANGE UP (ref 27–34)
MCHC RBC-ENTMCNC: 34.5 G/DL — SIGNIFICANT CHANGE UP (ref 32–36)
MCHC RBC-ENTMCNC: 35.4 G/DL — SIGNIFICANT CHANGE UP (ref 32–36)
MCHC RBC-ENTMCNC: 36.4 G/DL — HIGH (ref 32–36)
MCV RBC AUTO: 84.6 FL — SIGNIFICANT CHANGE UP (ref 80–100)
MCV RBC AUTO: 85.6 FL — SIGNIFICANT CHANGE UP (ref 80–100)
MCV RBC AUTO: 86.2 FL — SIGNIFICANT CHANGE UP (ref 80–100)
PHOSPHATE SERPL-MCNC: 1.9 MG/DL — LOW (ref 2.5–4.5)
PLATELET # BLD AUTO: 142 K/UL — LOW (ref 150–400)
PLATELET # BLD AUTO: 143 K/UL — LOW (ref 150–400)
PLATELET # BLD AUTO: 150 K/UL — SIGNIFICANT CHANGE UP (ref 150–400)
POTASSIUM SERPL-MCNC: 3.7 MMOL/L — SIGNIFICANT CHANGE UP (ref 3.5–5.3)
POTASSIUM SERPL-SCNC: 3.7 MMOL/L — SIGNIFICANT CHANGE UP (ref 3.5–5.3)
PROT SERPL-MCNC: 5.2 G/DL — LOW (ref 6–8.3)
PROTHROM AB SERPL-ACNC: 12.4 SEC — SIGNIFICANT CHANGE UP (ref 9.8–12.7)
RBC # BLD: 2.69 M/UL — LOW (ref 3.8–5.2)
RBC # BLD: 2.73 M/UL — LOW (ref 3.8–5.2)
RBC # BLD: 2.77 M/UL — LOW (ref 3.8–5.2)
RBC # FLD: 14.1 % — SIGNIFICANT CHANGE UP (ref 10.3–16.9)
RBC # FLD: 14.3 % — SIGNIFICANT CHANGE UP (ref 10.3–16.9)
RBC # FLD: 14.4 % — SIGNIFICANT CHANGE UP (ref 10.3–16.9)
SODIUM SERPL-SCNC: 137 MMOL/L — SIGNIFICANT CHANGE UP (ref 135–145)
SURGICAL PATHOLOGY STUDY: SIGNIFICANT CHANGE UP
WBC # BLD: 11.5 K/UL — HIGH (ref 3.8–10.5)
WBC # BLD: 9.2 K/UL — SIGNIFICANT CHANGE UP (ref 3.8–10.5)
WBC # BLD: 9.4 K/UL — SIGNIFICANT CHANGE UP (ref 3.8–10.5)
WBC # FLD AUTO: 11.5 K/UL — HIGH (ref 3.8–10.5)
WBC # FLD AUTO: 9.2 K/UL — SIGNIFICANT CHANGE UP (ref 3.8–10.5)
WBC # FLD AUTO: 9.4 K/UL — SIGNIFICANT CHANGE UP (ref 3.8–10.5)

## 2017-11-22 PROCEDURE — 71010: CPT | Mod: 26

## 2017-11-22 RX ORDER — SENNA PLUS 8.6 MG/1
2 TABLET ORAL AT BEDTIME
Qty: 0 | Refills: 0 | Status: DISCONTINUED | OUTPATIENT
Start: 2017-11-22 | End: 2017-11-25

## 2017-11-22 RX ORDER — LABETALOL HCL 100 MG
10 TABLET ORAL ONCE
Qty: 0 | Refills: 0 | Status: COMPLETED | OUTPATIENT
Start: 2017-11-22 | End: 2017-11-22

## 2017-11-22 RX ORDER — MAGNESIUM SULFATE 500 MG/ML
1 VIAL (ML) INJECTION ONCE
Qty: 0 | Refills: 0 | Status: COMPLETED | OUTPATIENT
Start: 2017-11-22 | End: 2017-11-22

## 2017-11-22 RX ORDER — POTASSIUM PHOSPHATE, MONOBASIC POTASSIUM PHOSPHATE, DIBASIC 236; 224 MG/ML; MG/ML
30 INJECTION, SOLUTION INTRAVENOUS ONCE
Qty: 0 | Refills: 0 | Status: COMPLETED | OUTPATIENT
Start: 2017-11-22 | End: 2017-11-22

## 2017-11-22 RX ORDER — DOCUSATE SODIUM 100 MG
100 CAPSULE ORAL
Qty: 0 | Refills: 0 | Status: DISCONTINUED | OUTPATIENT
Start: 2017-11-22 | End: 2017-11-23

## 2017-11-22 RX ORDER — NEBIVOLOL HYDROCHLORIDE 5 MG/1
2.5 TABLET ORAL DAILY
Qty: 0 | Refills: 0 | Status: DISCONTINUED | OUTPATIENT
Start: 2017-11-22 | End: 2017-11-25

## 2017-11-22 RX ORDER — AMLODIPINE BESYLATE 2.5 MG/1
5 TABLET ORAL DAILY
Qty: 0 | Refills: 0 | Status: DISCONTINUED | OUTPATIENT
Start: 2017-11-22 | End: 2017-11-25

## 2017-11-22 RX ADMIN — AMLODIPINE BESYLATE 5 MILLIGRAM(S): 2.5 TABLET ORAL at 09:55

## 2017-11-22 RX ADMIN — Medication 25 MILLIGRAM(S): at 09:30

## 2017-11-22 RX ADMIN — HYDROMORPHONE HYDROCHLORIDE 1 MILLIGRAM(S): 2 INJECTION INTRAMUSCULAR; INTRAVENOUS; SUBCUTANEOUS at 07:42

## 2017-11-22 RX ADMIN — HYDROMORPHONE HYDROCHLORIDE 1 MILLIGRAM(S): 2 INJECTION INTRAMUSCULAR; INTRAVENOUS; SUBCUTANEOUS at 15:29

## 2017-11-22 RX ADMIN — HYDROMORPHONE HYDROCHLORIDE 1 MILLIGRAM(S): 2 INJECTION INTRAMUSCULAR; INTRAVENOUS; SUBCUTANEOUS at 07:28

## 2017-11-22 RX ADMIN — HYDROMORPHONE HYDROCHLORIDE 1 MILLIGRAM(S): 2 INJECTION INTRAMUSCULAR; INTRAVENOUS; SUBCUTANEOUS at 16:00

## 2017-11-22 RX ADMIN — HYDROMORPHONE HYDROCHLORIDE 1 MILLIGRAM(S): 2 INJECTION INTRAMUSCULAR; INTRAVENOUS; SUBCUTANEOUS at 00:42

## 2017-11-22 RX ADMIN — Medication 100 MILLIGRAM(S): at 18:48

## 2017-11-22 RX ADMIN — POTASSIUM PHOSPHATE, MONOBASIC POTASSIUM PHOSPHATE, DIBASIC 85 MILLIMOLE(S): 236; 224 INJECTION, SOLUTION INTRAVENOUS at 07:35

## 2017-11-22 RX ADMIN — SENNA PLUS 2 TABLET(S): 8.6 TABLET ORAL at 15:29

## 2017-11-22 RX ADMIN — HYDROMORPHONE HYDROCHLORIDE 0.5 MILLIGRAM(S): 2 INJECTION INTRAMUSCULAR; INTRAVENOUS; SUBCUTANEOUS at 08:00

## 2017-11-22 RX ADMIN — Medication 100 GRAM(S): at 06:56

## 2017-11-22 RX ADMIN — NEBIVOLOL HYDROCHLORIDE 2.5 MILLIGRAM(S): 5 TABLET ORAL at 11:46

## 2017-11-22 RX ADMIN — Medication 10 MILLIGRAM(S): at 04:29

## 2017-11-22 RX ADMIN — HYDROMORPHONE HYDROCHLORIDE 1 MILLIGRAM(S): 2 INJECTION INTRAMUSCULAR; INTRAVENOUS; SUBCUTANEOUS at 01:20

## 2017-11-22 NOTE — PROVIDER CONTACT NOTE (CHANGE IN STATUS NOTIFICATION) - ACTION/TREATMENT ORDERED:
MD notified and MD assessed LEE insertion site and redressed with drain sponge and gauze, cleaned with chlorhexidine. Will continue to monitor. No orders at this time.

## 2017-11-22 NOTE — PROGRESS NOTE ADULT - SUBJECTIVE AND OBJECTIVE BOX
Interval Events:  No issues overnight. Pt noted to have increased drainage around the LEE this am. LEE noted to be clogged therefore stripped and drainage around tube subsided. Pt complaining about mild appropriate incisional pain that is relieved by pain medicine.     Patient seen and examined at bedside.      Allergies    No Known Allergies    Intolerances        Vital Signs Last 24 Hrs  T(C): 37.1 (22 Nov 2017 06:21), Max: 38.1 (21 Nov 2017 17:50)  T(F): 98.7 (22 Nov 2017 06:21), Max: 100.5 (21 Nov 2017 17:50)  HR: 106 (22 Nov 2017 10:00) (90 - 118)  BP: 140/62 (22 Nov 2017 10:00) (108/46 - 164/68)  BP(mean): 86 (22 Nov 2017 10:00) (64 - 108)  RR: 20 (22 Nov 2017 10:00) (14 - 25)  SpO2: 95% (22 Nov 2017 10:00) (92% - 99%)    11-21 @ 07:01  -  11-22 @ 07:00  --------------------------------------------------------  IN: 2810 mL / OUT: 1850 mL / NET: 960 mL    11-22 @ 07:01 - 11-22 @ 10:35  --------------------------------------------------------  IN: 90 mL / OUT: 370 mL / NET: -280 mL      11-21 @ 07:01  -  11-22 @ 07:00  --------------------------------------------------------  IN: 2810 mL / OUT: 1850 mL / NET: 960 mL    11-22 @ 07:01  -  11-22 @ 10:35  --------------------------------------------------------  IN: 90 mL / OUT: 370 mL / NET: -280 mL          LABS:  ABG - ( 21 Nov 2017 06:45 )  pH: 7.34  /  pCO2: 43    /  pO2: 73    / HCO3: 23    / Base Excess: -3.1  /  SaO2: 94                  CBC Full  -  ( 22 Nov 2017 05:15 )  WBC Count : 9.4 K/uL  Hemoglobin : 8.0 g/dL  Hematocrit : 23.2 %  Platelet Count - Automated : 143 K/uL  Mean Cell Volume : 86.2 fL  Mean Cell Hemoglobin : 29.7 pg  Mean Cell Hemoglobin Concentration : 34.5 g/dL  Auto Neutrophil # : x  Auto Lymphocyte # : x  Auto Monocyte # : x  Auto Eosinophil # : x  Auto Basophil # : x  Auto Neutrophil % : x  Auto Lymphocyte % : x  Auto Monocyte % : x  Auto Eosinophil % : x  Auto Basophil % : x    11-22    137  |  104  |  7   ----------------------------<  102<H>  3.7   |  22  |  0.65    Ca    7.6<L>      22 Nov 2017 05:19  Phos  1.9     11-22  Mg     1.7     11-22    TPro  5.2<L>  /  Alb  2.4<L>  /  TBili  1.0  /  DBili  0.4<H>  /  AST  53<H>  /  ALT  53<H>  /  AlkPhos  87  11-22    PT/INR - ( 22 Nov 2017 05:16 )   PT: 12.4 sec;   INR: 1.11          PTT - ( 22 Nov 2017 05:16 )  PTT:30.4 sec                RADIOLOGY & ADDITIONAL STUDIES (The following images were personally reviewed):      Physical Exam:     Genl: NAD, resting comfortably in bed, very still. Well developed, well groomed.  Neuro: A&OX3 NO Deficits  Pulm: CTAB, No w/r/r  CV: RRR, no MRG  GI: abdomen mildly distended,Tender to palp around incision site. Large right subcostal incision CDI. Multiple lap incisions also CDI. RUQ LEE with sanguinous output. No BS  Genitourinary: Cochran draining clear yellow urine  Extremities: WWP, no edema, no cyanosis  Vascular: palpable DP b/l  Skin: warm, dry.   Musculoskeletal: no joint swelling  Psychiatric: appropriate affect,     A/p: 73yo F with PMhx of DM/HTN/HLD with RUQ, possible acute cholecystitis and choledocholithiasis, s/p ERCP with stent placement, now s/p cholecystectomy c/b hypotension and acute blood loss anemia, RTOR for diagnostic lap - oozing from gallbladder fossa and branch of R hepatic artery - hemostasis achieved    Neuro: Dilaudid prn, IV Tylenol prn benadryl prn   CV: normotensive goals, NS@90 norvasc 5 bystolic 2.5 started today 2* HTN .CBC Drifting down recheck level in afternoon  Pulm: satting well on NC  GI/FEN: clears, NS@90  : Cochran poss d/c cochran if cbc stable @12 senna colace added for constipation   ID: Zosyn ( 11/20-11/21)   Endo: ISS  Heme: s/p 6 u pRBC , 2 FFP 1 plts  PPX: SCDs no SQH 2* bleed risk   Lines: PIVsStephanie (11/20--), RUQ LEE drain  Dispo: Pos SDU PT/OT ordered

## 2017-11-22 NOTE — CHART NOTE - NSCHARTNOTEFT_GEN_A_CORE
Admitting Diagnosis:   71yo F with PMhx of DM/HTN/HLD with RUQ, possible acute cholecystitis and choledocholithiasis, s/p ERCP with stent placement, now s/p cholecystectomy c/b hypotension and acute blood loss anemia, RTOR for diagnostic lap - oozing from gallbladder fossa and branch of R hepatic artery - hemostasis achieved    PAST MEDICAL & SURGICAL HISTORY:  High cholesterol  DM (diabetes mellitus)  HTN (hypertension)  H/O total hysterectomy  History of  delivery, antepartum    Current Nutrition Order: Consistent CHO clears    PO Intake: Good (%) [  X ]  Fair (50-75%) [   ] Poor (<25%) [   ]    GI Issues: Feels bloated. No n/v/d/c reported    Pain: None reported    Skin Integrity: Surgical incision    Labs:       137  |  104  |  7   ----------------------------<  102<H>  3.7   |  22  |  0.65    Ca    7.6<L>      2017 05:19  Phos  1.9       Mg     1.7         TPro  5.2<L>  /  Alb  2.4<L>  /  TBili  1.0  /  DBili  0.4<H>  /  AST  53<H>  /  ALT  53<H>  /  AlkPhos  87      POCT Blood Glucose.: 97 mg/dL (2017 11:39)  POCT Blood Glucose.: 100 mg/dL (2017 06:30)  POCT Blood Glucose.: 113 mg/dL (2017 23:30)  POCT Blood Glucose.: 125 mg/dL (2017 16:04)    Medications:  MEDICATIONS  (STANDING):  amLODIPine   Tablet 5 milliGRAM(s) Oral daily  dextrose 5%. 1000 milliLiter(s) (50 mL/Hr) IV Continuous <Continuous>  dextrose 50% Injectable 12.5 Gram(s) IV Push once  dextrose 50% Injectable 25 Gram(s) IV Push once  dextrose 50% Injectable 25 Gram(s) IV Push once  docusate sodium 100 milliGRAM(s) Oral two times a day  insulin lispro (HumaLOG) corrective regimen sliding scale   SubCutaneous every 6 hours  nebivolol 2.5 milliGRAM(s) Oral daily  senna 2 Tablet(s) Oral at bedtime  sodium chloride 0.9%. 1000 milliLiter(s) (90 mL/Hr) IV Continuous <Continuous>    MEDICATIONS  (PRN):  dextrose Gel 1 Dose(s) Oral once PRN Blood Glucose LESS THAN 70 milliGRAM(s)/deciliter  diphenhydrAMINE   Injectable 25 milliGRAM(s) IV Push every 6 hours PRN Itching  glucagon  Injectable 1 milliGRAM(s) IntraMuscular once PRN Glucose LESS THAN 70 milligrams/deciliter  HYDROmorphone  Injectable 0.5 milliGRAM(s) IV Push every 4 hours PRN Moderate Pain (4 - 6)  HYDROmorphone  Injectable 1 milliGRAM(s) IV Push every 4 hours PRN Severe Pain (7 - 10)  ondansetron Injectable 4 milliGRAM(s) IV Push every 6 hours PRN Nausea and/or Vomiting    Weight:  49.9   49.9     Estimated energy needs using 50 kg ABW:  Needs increased s/p surgery  25-30 kcal/kg (5209-4346 kcal).  1.2-1.4 g/kg (60-70 g protein).   30-35 mL/kg (7953-6436 mL fluid).      Subjective: Son translated at bedside. Reports mom tolerated clears at breakfast with no GI distress besides bloating. States wt stable PTA.     Previous Nutrition Diagnosis:  Inadequate energy intake RT inability to take PO AEB NPO    Active [   ]  Resolved [X   ]    If resolved, new PES: Inadequate oral intake RT current order AEB clear diet - unable to meet 100% of nutrition needs    Goal: Advance diet within 24-48 hours    Recommendations:   1. Advance diet to consistent CHO ( no snacks)  2. Trend weights    Education: Spoke with son regarding possible diet advancement    Risk Level: High [  X ] Moderate [   ] Low [   ]

## 2017-11-23 LAB
ALBUMIN SERPL ELPH-MCNC: 2.5 G/DL — LOW (ref 3.3–5)
ALP SERPL-CCNC: 99 U/L — SIGNIFICANT CHANGE UP (ref 40–120)
ALT FLD-CCNC: 47 U/L — HIGH (ref 10–45)
ANION GAP SERPL CALC-SCNC: 17 MMOL/L — SIGNIFICANT CHANGE UP (ref 5–17)
APTT BLD: 28.7 SEC — SIGNIFICANT CHANGE UP (ref 27.5–37.4)
AST SERPL-CCNC: 43 U/L — HIGH (ref 10–40)
BILIRUB DIRECT SERPL-MCNC: 0.4 MG/DL — HIGH (ref 0–0.2)
BILIRUB INDIRECT FLD-MCNC: 0.7 MG/DL — SIGNIFICANT CHANGE UP (ref 0.2–1)
BILIRUB SERPL-MCNC: 1.1 MG/DL — SIGNIFICANT CHANGE UP (ref 0.2–1.2)
BUN SERPL-MCNC: 8 MG/DL — SIGNIFICANT CHANGE UP (ref 7–23)
CALCIUM SERPL-MCNC: 8.6 MG/DL — SIGNIFICANT CHANGE UP (ref 8.4–10.5)
CHLORIDE SERPL-SCNC: 99 MMOL/L — SIGNIFICANT CHANGE UP (ref 96–108)
CO2 SERPL-SCNC: 21 MMOL/L — LOW (ref 22–31)
CREAT SERPL-MCNC: 0.81 MG/DL — SIGNIFICANT CHANGE UP (ref 0.5–1.3)
GLUCOSE BLDC GLUCOMTR-MCNC: 282 MG/DL — HIGH (ref 70–99)
GLUCOSE BLDC GLUCOMTR-MCNC: 302 MG/DL — HIGH (ref 70–99)
GLUCOSE BLDC GLUCOMTR-MCNC: 84 MG/DL — SIGNIFICANT CHANGE UP (ref 70–99)
GLUCOSE BLDC GLUCOMTR-MCNC: 84 MG/DL — SIGNIFICANT CHANGE UP (ref 70–99)
GLUCOSE SERPL-MCNC: 85 MG/DL — SIGNIFICANT CHANGE UP (ref 70–99)
HCT VFR BLD CALC: 24.4 % — LOW (ref 34.5–45)
HGB BLD-MCNC: 8.6 G/DL — LOW (ref 11.5–15.5)
INR BLD: 0.99 — SIGNIFICANT CHANGE UP (ref 0.88–1.16)
MAGNESIUM SERPL-MCNC: 1.9 MG/DL — SIGNIFICANT CHANGE UP (ref 1.6–2.6)
MCHC RBC-ENTMCNC: 30.5 PG — SIGNIFICANT CHANGE UP (ref 27–34)
MCHC RBC-ENTMCNC: 35.2 G/DL — SIGNIFICANT CHANGE UP (ref 32–36)
MCV RBC AUTO: 86.5 FL — SIGNIFICANT CHANGE UP (ref 80–100)
PHOSPHATE SERPL-MCNC: 3 MG/DL — SIGNIFICANT CHANGE UP (ref 2.5–4.5)
PLATELET # BLD AUTO: 181 K/UL — SIGNIFICANT CHANGE UP (ref 150–400)
POTASSIUM SERPL-MCNC: 3.7 MMOL/L — SIGNIFICANT CHANGE UP (ref 3.5–5.3)
POTASSIUM SERPL-SCNC: 3.7 MMOL/L — SIGNIFICANT CHANGE UP (ref 3.5–5.3)
PROT SERPL-MCNC: 5.8 G/DL — LOW (ref 6–8.3)
PROTHROM AB SERPL-ACNC: 11 SEC — SIGNIFICANT CHANGE UP (ref 9.8–12.7)
RBC # BLD: 2.82 M/UL — LOW (ref 3.8–5.2)
RBC # FLD: 14 % — SIGNIFICANT CHANGE UP (ref 10.3–16.9)
SODIUM SERPL-SCNC: 137 MMOL/L — SIGNIFICANT CHANGE UP (ref 135–145)
WBC # BLD: 11.4 K/UL — HIGH (ref 3.8–10.5)
WBC # FLD AUTO: 11.4 K/UL — HIGH (ref 3.8–10.5)

## 2017-11-23 PROCEDURE — 71010: CPT | Mod: 26

## 2017-11-23 RX ORDER — HEPARIN SODIUM 5000 [USP'U]/ML
5000 INJECTION INTRAVENOUS; SUBCUTANEOUS EVERY 12 HOURS
Qty: 0 | Refills: 0 | Status: DISCONTINUED | OUTPATIENT
Start: 2017-11-23 | End: 2017-11-23

## 2017-11-23 RX ORDER — OXYCODONE AND ACETAMINOPHEN 5; 325 MG/1; MG/1
2 TABLET ORAL EVERY 4 HOURS
Qty: 0 | Refills: 0 | Status: DISCONTINUED | OUTPATIENT
Start: 2017-11-23 | End: 2017-11-25

## 2017-11-23 RX ORDER — DOCUSATE SODIUM 100 MG
100 CAPSULE ORAL THREE TIMES A DAY
Qty: 0 | Refills: 0 | Status: DISCONTINUED | OUTPATIENT
Start: 2017-11-23 | End: 2017-11-25

## 2017-11-23 RX ORDER — OXYCODONE AND ACETAMINOPHEN 5; 325 MG/1; MG/1
1 TABLET ORAL EVERY 4 HOURS
Qty: 0 | Refills: 0 | Status: DISCONTINUED | OUTPATIENT
Start: 2017-11-23 | End: 2017-11-25

## 2017-11-23 RX ADMIN — NEBIVOLOL HYDROCHLORIDE 2.5 MILLIGRAM(S): 5 TABLET ORAL at 07:36

## 2017-11-23 RX ADMIN — AMLODIPINE BESYLATE 5 MILLIGRAM(S): 2.5 TABLET ORAL at 06:46

## 2017-11-23 RX ADMIN — Medication 100 MILLIGRAM(S): at 06:47

## 2017-11-23 RX ADMIN — HYDROMORPHONE HYDROCHLORIDE 0.5 MILLIGRAM(S): 2 INJECTION INTRAMUSCULAR; INTRAVENOUS; SUBCUTANEOUS at 10:12

## 2017-11-23 RX ADMIN — Medication 100 MILLIGRAM(S): at 16:31

## 2017-11-23 RX ADMIN — HYDROMORPHONE HYDROCHLORIDE 0.5 MILLIGRAM(S): 2 INJECTION INTRAMUSCULAR; INTRAVENOUS; SUBCUTANEOUS at 09:55

## 2017-11-23 RX ADMIN — OXYCODONE AND ACETAMINOPHEN 1 TABLET(S): 5; 325 TABLET ORAL at 17:25

## 2017-11-23 RX ADMIN — Medication 6: at 18:00

## 2017-11-23 RX ADMIN — OXYCODONE AND ACETAMINOPHEN 1 TABLET(S): 5; 325 TABLET ORAL at 16:46

## 2017-11-23 NOTE — PHYSICAL THERAPY INITIAL EVALUATION ADULT - ADDITIONAL COMMENTS
Pt reports prior to hospitalization she was independent in all functional mobility and ADLs. Lives with son and his family in a house with 3 steps to enter from the garage. Reports she used a walker following surgery a long time ago however at this time owns no AD. Pt states she was active and was walking to pick her grandkids up from the bus stop prior to surgery.

## 2017-11-23 NOTE — PHYSICAL THERAPY INITIAL EVALUATION ADULT - PERTINENT HX OF CURRENT PROBLEM, REHAB EVAL
71yo F with PMhx of DM/HTN/HLD with RUQ, possible acute cholecystitis and choledocholithiasis, s/p ERCP with stent placement, now s/p cholecystectomy c/b hypotension and acute blood loss anemia, RTOR for diagnostic lap - oozing from gallbladder fossa and branch of R hepatic artery - hemostasis achieved

## 2017-11-23 NOTE — PROGRESS NOTE ADULT - ASSESSMENT
A/p: 71yo F with PMhx of DM/HTN/HLD with RUQ, possible acute cholecystitis and choledocholithiasis, s/p ERCP with stent placement, now s/p cholecystectomy c/b hypotension and acute blood loss anemia, RTOR for diagnostic lap - oozing from gallbladder fossa and branch of R hepatic artery - hemostasis achieved    Neuro: Dilaudid prn, IV Tylenol prn benadryl prn   CV: normotensive goals,  norvasc 5 bystolic 2.5   Pulm: satting well on NC  GI/FEN: DM Diet .   GUTOV   senna colace   ID: Zosyn ( 11/20-11/21)   Endo: ISS  Heme: s/p 6 u pRBC , 2 FFP 1 plts  PPX: SCDs no SQH 2* bleed risk   Lines: Stephanie Mao (11/20--11/22), RUQ LEE drain  Dispo: PT/OT ordered A/p: 73yo F with PMhx of DM/HTN/HLD with RUQ, possible acute cholecystitis and choledocholithiasis, s/p ERCP with stent placement, now s/p cholecystectomy c/b hypotension and acute blood loss anemia, RTOR for diagnostic lap - oozing from gallbladder fossa and branch of R hepatic artery - hemostasis achieved  pain control  dvt ppx   AM labs   CCD   CTM FSG   AM CXR

## 2017-11-23 NOTE — PHYSICAL THERAPY INITIAL EVALUATION ADULT - GENERAL OBSERVATIONS, REHAB EVAL
Pt received supine, +telemetry, +LEE x1, +heplock, NAD, agreeable to PT. Cantonese  through [employer] utilized during session.

## 2017-11-23 NOTE — PROGRESS NOTE ADULT - SUBJECTIVE AND OBJECTIVE BOX
O/N: MARK, VSS, passed TOV, good U/O overnight  11/22: repeat cbc stable ivf d/c'd, a-line DC'd, diet advanced up for transfer to SDU. TOV @ 10PM     STATUS POST:  11/20: laparoscopic cholecystectomy  11/20: return to OR, diagnostic lap, exploration of gallbladder fossa, clipping of R hepatic artery branch, packing, hemostasis achieved.   11/21: Ex-lap, ligation of branch of right hepatic artery O/N: MARK, VSS, passed TOV, good U/O overnight  11/22: repeat cbc stable ivf d/c'd, a-line DC'd, diet advanced up for transfer to SDU. TOV @ 10PM     STATUS POST:  11/20: laparoscopic cholecystectomy  11/20: return to OR, diagnostic lap, exploration of gallbladder fossa, clipping of R hepatic artery branch, packing, hemostasis achieved.   11/21: Ex-lap, ligation of branch of right hepatic artery      Patient resting comfortably in bed     Vital Signs Last 24 Hrs  T(C): 36.4 (23 Nov 2017 05:00), Max: 38 (22 Nov 2017 14:22)  T(F): 97.5 (23 Nov 2017 05:00), Max: 100.4 (22 Nov 2017 14:22)  HR: 80 (23 Nov 2017 04:55) (80 - 108)  BP: 149/67 (23 Nov 2017 04:55) (126/56 - 166/81)  BP(mean): 97 (23 Nov 2017 04:55) (79 - 112)  RR: 17 (23 Nov 2017 04:55) (15 - 27)  SpO2: 97% (23 Nov 2017 04:55) (92% - 97%)    I&O's Summary    22 Nov 2017 07:01  -  23 Nov 2017 07:00  --------------------------------------------------------  IN: 603.2 mL / OUT: 2680 mL / NET: -2076.8 mL        Gen: NAD   Abd: soft, nt / nd

## 2017-11-24 LAB
ALBUMIN SERPL ELPH-MCNC: 2.8 G/DL — LOW (ref 3.3–5)
ALP SERPL-CCNC: 99 U/L — SIGNIFICANT CHANGE UP (ref 40–120)
ALT FLD-CCNC: 39 U/L — SIGNIFICANT CHANGE UP (ref 10–45)
ANION GAP SERPL CALC-SCNC: 12 MMOL/L — SIGNIFICANT CHANGE UP (ref 5–17)
APTT BLD: 26.8 SEC — LOW (ref 27.5–37.4)
AST SERPL-CCNC: 31 U/L — SIGNIFICANT CHANGE UP (ref 10–40)
BILIRUB DIRECT SERPL-MCNC: 0.2 MG/DL — SIGNIFICANT CHANGE UP (ref 0–0.2)
BILIRUB INDIRECT FLD-MCNC: 0.5 MG/DL — SIGNIFICANT CHANGE UP (ref 0.2–1)
BILIRUB SERPL-MCNC: 0.7 MG/DL — SIGNIFICANT CHANGE UP (ref 0.2–1.2)
BUN SERPL-MCNC: 17 MG/DL — SIGNIFICANT CHANGE UP (ref 7–23)
CALCIUM SERPL-MCNC: 8.5 MG/DL — SIGNIFICANT CHANGE UP (ref 8.4–10.5)
CHLORIDE SERPL-SCNC: 100 MMOL/L — SIGNIFICANT CHANGE UP (ref 96–108)
CO2 SERPL-SCNC: 26 MMOL/L — SIGNIFICANT CHANGE UP (ref 22–31)
CREAT SERPL-MCNC: 0.65 MG/DL — SIGNIFICANT CHANGE UP (ref 0.5–1.3)
GLUCOSE BLDC GLUCOMTR-MCNC: 102 MG/DL — HIGH (ref 70–99)
GLUCOSE BLDC GLUCOMTR-MCNC: 132 MG/DL — HIGH (ref 70–99)
GLUCOSE BLDC GLUCOMTR-MCNC: 152 MG/DL — HIGH (ref 70–99)
GLUCOSE BLDC GLUCOMTR-MCNC: 203 MG/DL — HIGH (ref 70–99)
GLUCOSE BLDC GLUCOMTR-MCNC: 207 MG/DL — HIGH (ref 70–99)
GLUCOSE SERPL-MCNC: 221 MG/DL — HIGH (ref 70–99)
HCT VFR BLD CALC: 26.7 % — LOW (ref 34.5–45)
HGB BLD-MCNC: 9.1 G/DL — LOW (ref 11.5–15.5)
MAGNESIUM SERPL-MCNC: 1.8 MG/DL — SIGNIFICANT CHANGE UP (ref 1.6–2.6)
MCHC RBC-ENTMCNC: 30.5 PG — SIGNIFICANT CHANGE UP (ref 27–34)
MCHC RBC-ENTMCNC: 34.1 G/DL — SIGNIFICANT CHANGE UP (ref 32–36)
MCV RBC AUTO: 89.6 FL — SIGNIFICANT CHANGE UP (ref 80–100)
PHOSPHATE SERPL-MCNC: 2.2 MG/DL — LOW (ref 2.5–4.5)
PLATELET # BLD AUTO: 235 K/UL — SIGNIFICANT CHANGE UP (ref 150–400)
POTASSIUM SERPL-MCNC: 3.7 MMOL/L — SIGNIFICANT CHANGE UP (ref 3.5–5.3)
POTASSIUM SERPL-SCNC: 3.7 MMOL/L — SIGNIFICANT CHANGE UP (ref 3.5–5.3)
PROT SERPL-MCNC: 6.3 G/DL — SIGNIFICANT CHANGE UP (ref 6–8.3)
RBC # BLD: 2.98 M/UL — LOW (ref 3.8–5.2)
RBC # FLD: 13.5 % — SIGNIFICANT CHANGE UP (ref 10.3–16.9)
SODIUM SERPL-SCNC: 138 MMOL/L — SIGNIFICANT CHANGE UP (ref 135–145)
WBC # BLD: 10.6 K/UL — HIGH (ref 3.8–10.5)
WBC # FLD AUTO: 10.6 K/UL — HIGH (ref 3.8–10.5)

## 2017-11-24 RX ORDER — METFORMIN HYDROCHLORIDE 850 MG/1
750 TABLET ORAL DAILY
Qty: 0 | Refills: 0 | Status: DISCONTINUED | OUTPATIENT
Start: 2017-11-24 | End: 2017-11-25

## 2017-11-24 RX ORDER — POTASSIUM PHOSPHATE, MONOBASIC POTASSIUM PHOSPHATE, DIBASIC 236; 224 MG/ML; MG/ML
15 INJECTION, SOLUTION INTRAVENOUS ONCE
Qty: 0 | Refills: 0 | Status: COMPLETED | OUTPATIENT
Start: 2017-11-24 | End: 2017-11-24

## 2017-11-24 RX ADMIN — Medication 100 MILLIGRAM(S): at 22:27

## 2017-11-24 RX ADMIN — SENNA PLUS 2 TABLET(S): 8.6 TABLET ORAL at 22:26

## 2017-11-24 RX ADMIN — Medication 4: at 06:24

## 2017-11-24 RX ADMIN — NEBIVOLOL HYDROCHLORIDE 2.5 MILLIGRAM(S): 5 TABLET ORAL at 06:23

## 2017-11-24 RX ADMIN — Medication 100 MILLIGRAM(S): at 13:22

## 2017-11-24 RX ADMIN — POTASSIUM PHOSPHATE, MONOBASIC POTASSIUM PHOSPHATE, DIBASIC 62.5 MILLIMOLE(S): 236; 224 INJECTION, SOLUTION INTRAVENOUS at 09:27

## 2017-11-24 RX ADMIN — Medication 100 MILLIGRAM(S): at 22:50

## 2017-11-24 RX ADMIN — Medication 100 MILLIGRAM(S): at 06:23

## 2017-11-24 RX ADMIN — Medication 5 MILLIGRAM(S): at 15:08

## 2017-11-24 RX ADMIN — METFORMIN HYDROCHLORIDE 750 MILLIGRAM(S): 850 TABLET ORAL at 15:08

## 2017-11-24 RX ADMIN — Medication 100 MILLIGRAM(S): at 00:55

## 2017-11-24 RX ADMIN — SENNA PLUS 2 TABLET(S): 8.6 TABLET ORAL at 00:54

## 2017-11-24 RX ADMIN — AMLODIPINE BESYLATE 5 MILLIGRAM(S): 2.5 TABLET ORAL at 06:23

## 2017-11-24 NOTE — PROGRESS NOTE ADULT - ASSESSMENT
A/p: 71yo F with PMhx of DM/HTN/HLD with RUQ, possible acute cholecystitis and choledocholithiasis, s/p ERCP with stent placement, now s/p cholecystectomy c/b hypotension and acute blood loss anemia, RTOR for diagnostic lap - oozing from gallbladder fossa and branch of R hepatic artery - hemostasis achieved    Neuro: percocet prn, IV Tylenol prn benadryl prn   CV: normotensive goals,  norvasc 5 bystolic 2.5   Pulm: satting well on NC  GI/FEN: DM Diet .   GUTOV   senna, colace   ID: Zosyn ( 11/20-11/21)   Endo: ISS  Heme: s/p 6 u pRBC , 2 FFP 1 plts  PPX: SCDs no SQH 2* bleed risk   Lines: Stephanie Mao (11/20--11/22), RUQ LEE drain  Dispo: PT/OT ordered A/p: 73yo F with PMhx of DM/HTN/HLD with RUQ, possible acute cholecystitis and choledocholithiasis, s/p ERCP with stent placement, now s/p cholecystectomy c/b hypotension and acute blood loss anemia, RTOR for diagnostic lap - oozing from gallbladder fossa and branch of R hepatic artery - hemostasis achieved    Neuro: percocet prn, IV Tylenol prn benadryl prn   CV: normotensive goals,  norvasc 5 bystolic 2.5   Pulm: satting well on NC  GI/FEN: DM Diet, senna, colace  : voiding  ID: Zosyn ( 11/20-11/21)   Endo: ISS  Heme: s/p 6 u pRBC , 2 FFP 1 plts  PPX: SCDs no SQH 2* bleed risk   Lines: Stephanie Mao (11/20--11/22), RUQ LEE drain  Dispo: PT/OT ordered A/p: 71yo F with PMhx of DM/HTN/HLD with RUQ, possible acute cholecystitis and choledocholithiasis, s/p ERCP with stent placement, now s/p cholecystectomy c/b hypotension and acute blood loss anemia, RTOR for diagnostic lap - oozing from gallbladder fossa and branch of R hepatic artery - hemostasis achieved  pain control   DVT ppx   Reg diet

## 2017-11-24 NOTE — PROGRESS NOTE ADULT - SUBJECTIVE AND OBJECTIVE BOX
O/N: OOBA, tolerating diet, VSS, +flatus/-BM  11/23: PO pain meds / senna / colace, MARK     STATUS POST:  11/20: laparoscopic cholecystectomy  11/20: return to OR, diagnostic lap, exploration of gallbladder fossa, clipping of R hepatic artery branch, packing, hemostasis achieved.   11/21: Ex-lap, ligation of branch of right hepatic artery    POD# 3, 4 O/N: OOBA, tolerating diet, VSS, +flatus/-BM  11/23: PO pain meds / senna / colace, MARK     STATUS POST:  11/20: laparoscopic cholecystectomy  11/20: return to OR, diagnostic lap, exploration of gallbladder fossa, clipping of R hepatic artery branch, packing, hemostasis achieved.   11/21: Ex-lap, ligation of branch of right hepatic artery    POD# 3, 4      Vital Signs Last 24 Hrs  T(C): 36.8 (24 Nov 2017 06:24), Max: 37.1 (23 Nov 2017 17:25)  T(F): 98.3 (24 Nov 2017 06:24), Max: 98.8 (23 Nov 2017 17:25)  HR: 72 (24 Nov 2017 04:42) (64 - 78)  BP: 135/64 (24 Nov 2017 04:42) (102/51 - 135/64)  BP(mean): 92 (24 Nov 2017 04:42) (73 - 92)  RR: 17 (24 Nov 2017 04:42) (17 - 18)  SpO2: 96% (24 Nov 2017 04:42) (91% - 97%)      I&O's Summary    22 Nov 2017 07:01  -  23 Nov 2017 07:00  --------------------------------------------------------  IN: 603.2 mL / OUT: 2980 mL / NET: -2376.8 mL    23 Nov 2017 07:01  -  24 Nov 2017 06:44  --------------------------------------------------------  IN: 0 mL / OUT: 1098.5 mL / NET: -1098.5 mL        Gen: NAD   Abd: soft, nt / nd   incisions c / d / i

## 2017-11-25 ENCOUNTER — TRANSCRIPTION ENCOUNTER (OUTPATIENT)
Age: 72
End: 2017-11-25

## 2017-11-25 VITALS — TEMPERATURE: 97 F

## 2017-11-25 LAB
ALBUMIN SERPL ELPH-MCNC: 3 G/DL — LOW (ref 3.3–5)
ALP SERPL-CCNC: 100 U/L — SIGNIFICANT CHANGE UP (ref 40–120)
ALT FLD-CCNC: 35 U/L — SIGNIFICANT CHANGE UP (ref 10–45)
ANION GAP SERPL CALC-SCNC: 14 MMOL/L — SIGNIFICANT CHANGE UP (ref 5–17)
APTT BLD: 27.1 SEC — LOW (ref 27.5–37.4)
AST SERPL-CCNC: 24 U/L — SIGNIFICANT CHANGE UP (ref 10–40)
BILIRUB DIRECT SERPL-MCNC: 0.2 MG/DL — SIGNIFICANT CHANGE UP (ref 0–0.2)
BILIRUB INDIRECT FLD-MCNC: 0.7 MG/DL — SIGNIFICANT CHANGE UP (ref 0.2–1)
BILIRUB SERPL-MCNC: 0.9 MG/DL — SIGNIFICANT CHANGE UP (ref 0.2–1.2)
BUN SERPL-MCNC: 14 MG/DL — SIGNIFICANT CHANGE UP (ref 7–23)
CALCIUM SERPL-MCNC: 8.8 MG/DL — SIGNIFICANT CHANGE UP (ref 8.4–10.5)
CHLORIDE SERPL-SCNC: 98 MMOL/L — SIGNIFICANT CHANGE UP (ref 96–108)
CO2 SERPL-SCNC: 27 MMOL/L — SIGNIFICANT CHANGE UP (ref 22–31)
CREAT SERPL-MCNC: 0.51 MG/DL — SIGNIFICANT CHANGE UP (ref 0.5–1.3)
GLUCOSE BLDC GLUCOMTR-MCNC: 126 MG/DL — HIGH (ref 70–99)
GLUCOSE BLDC GLUCOMTR-MCNC: 163 MG/DL — HIGH (ref 70–99)
GLUCOSE SERPL-MCNC: 137 MG/DL — HIGH (ref 70–99)
HCT VFR BLD CALC: 27.4 % — LOW (ref 34.5–45)
HGB BLD-MCNC: 9.3 G/DL — LOW (ref 11.5–15.5)
INR BLD: 0.93 — SIGNIFICANT CHANGE UP (ref 0.88–1.16)
MAGNESIUM SERPL-MCNC: 1.6 MG/DL — SIGNIFICANT CHANGE UP (ref 1.6–2.6)
MCHC RBC-ENTMCNC: 30.1 PG — SIGNIFICANT CHANGE UP (ref 27–34)
MCHC RBC-ENTMCNC: 33.9 G/DL — SIGNIFICANT CHANGE UP (ref 32–36)
MCV RBC AUTO: 88.7 FL — SIGNIFICANT CHANGE UP (ref 80–100)
PHOSPHATE SERPL-MCNC: 3.2 MG/DL — SIGNIFICANT CHANGE UP (ref 2.5–4.5)
PLATELET # BLD AUTO: 273 K/UL — SIGNIFICANT CHANGE UP (ref 150–400)
POTASSIUM SERPL-MCNC: 3.6 MMOL/L — SIGNIFICANT CHANGE UP (ref 3.5–5.3)
POTASSIUM SERPL-SCNC: 3.6 MMOL/L — SIGNIFICANT CHANGE UP (ref 3.5–5.3)
PROT SERPL-MCNC: 6.6 G/DL — SIGNIFICANT CHANGE UP (ref 6–8.3)
PROTHROM AB SERPL-ACNC: 10.3 SEC — SIGNIFICANT CHANGE UP (ref 9.8–12.7)
RBC # BLD: 3.09 M/UL — LOW (ref 3.8–5.2)
RBC # FLD: 14.3 % — SIGNIFICANT CHANGE UP (ref 10.3–16.9)
SODIUM SERPL-SCNC: 139 MMOL/L — SIGNIFICANT CHANGE UP (ref 135–145)
WBC # BLD: 9.2 K/UL — SIGNIFICANT CHANGE UP (ref 3.8–10.5)
WBC # FLD AUTO: 9.2 K/UL — SIGNIFICANT CHANGE UP (ref 3.8–10.5)

## 2017-11-25 PROCEDURE — 82553 CREATINE MB FRACTION: CPT

## 2017-11-25 PROCEDURE — 86850 RBC ANTIBODY SCREEN: CPT

## 2017-11-25 PROCEDURE — 81001 URINALYSIS AUTO W/SCOPE: CPT

## 2017-11-25 PROCEDURE — 85730 THROMBOPLASTIN TIME PARTIAL: CPT

## 2017-11-25 PROCEDURE — 76000 FLUOROSCOPY <1 HR PHYS/QHP: CPT

## 2017-11-25 PROCEDURE — 85025 COMPLETE CBC W/AUTO DIFF WBC: CPT

## 2017-11-25 PROCEDURE — 83036 HEMOGLOBIN GLYCOSYLATED A1C: CPT

## 2017-11-25 PROCEDURE — P9016: CPT

## 2017-11-25 PROCEDURE — 82962 GLUCOSE BLOOD TEST: CPT

## 2017-11-25 PROCEDURE — 86900 BLOOD TYPING SEROLOGIC ABO: CPT

## 2017-11-25 PROCEDURE — 97161 PT EVAL LOW COMPLEX 20 MIN: CPT

## 2017-11-25 PROCEDURE — 80053 COMPREHEN METABOLIC PANEL: CPT

## 2017-11-25 PROCEDURE — 83605 ASSAY OF LACTIC ACID: CPT

## 2017-11-25 PROCEDURE — 36415 COLL VENOUS BLD VENIPUNCTURE: CPT

## 2017-11-25 PROCEDURE — 84295 ASSAY OF SERUM SODIUM: CPT

## 2017-11-25 PROCEDURE — 88304 TISSUE EXAM BY PATHOLOGIST: CPT

## 2017-11-25 PROCEDURE — 97116 GAIT TRAINING THERAPY: CPT

## 2017-11-25 PROCEDURE — 84100 ASSAY OF PHOSPHORUS: CPT

## 2017-11-25 PROCEDURE — 85018 HEMOGLOBIN: CPT

## 2017-11-25 PROCEDURE — 85027 COMPLETE CBC AUTOMATED: CPT

## 2017-11-25 PROCEDURE — 99285 EMERGENCY DEPT VISIT HI MDM: CPT | Mod: 25

## 2017-11-25 PROCEDURE — 93005 ELECTROCARDIOGRAM TRACING: CPT

## 2017-11-25 PROCEDURE — 82330 ASSAY OF CALCIUM: CPT

## 2017-11-25 PROCEDURE — 36430 TRANSFUSION BLD/BLD COMPNT: CPT

## 2017-11-25 PROCEDURE — 85610 PROTHROMBIN TIME: CPT

## 2017-11-25 PROCEDURE — 71045 X-RAY EXAM CHEST 1 VIEW: CPT

## 2017-11-25 PROCEDURE — 82150 ASSAY OF AMYLASE: CPT

## 2017-11-25 PROCEDURE — P9035: CPT

## 2017-11-25 PROCEDURE — 83690 ASSAY OF LIPASE: CPT

## 2017-11-25 PROCEDURE — 80076 HEPATIC FUNCTION PANEL: CPT

## 2017-11-25 PROCEDURE — 82803 BLOOD GASES ANY COMBINATION: CPT

## 2017-11-25 PROCEDURE — 80048 BASIC METABOLIC PNL TOTAL CA: CPT

## 2017-11-25 PROCEDURE — C1889: CPT

## 2017-11-25 PROCEDURE — 82550 ASSAY OF CK (CPK): CPT

## 2017-11-25 PROCEDURE — P9017: CPT

## 2017-11-25 PROCEDURE — 84484 ASSAY OF TROPONIN QUANT: CPT

## 2017-11-25 PROCEDURE — 83735 ASSAY OF MAGNESIUM: CPT

## 2017-11-25 PROCEDURE — 86923 COMPATIBILITY TEST ELECTRIC: CPT

## 2017-11-25 PROCEDURE — 84132 ASSAY OF SERUM POTASSIUM: CPT

## 2017-11-25 PROCEDURE — 86901 BLOOD TYPING SEROLOGIC RH(D): CPT

## 2017-11-25 PROCEDURE — 74181 MRI ABDOMEN W/O CONTRAST: CPT

## 2017-11-25 RX ORDER — DOCUSATE SODIUM 100 MG
1 CAPSULE ORAL
Qty: 0 | Refills: 0 | COMMUNITY
Start: 2017-11-25

## 2017-11-25 RX ORDER — POTASSIUM CHLORIDE 20 MEQ
20 PACKET (EA) ORAL ONCE
Qty: 0 | Refills: 0 | Status: COMPLETED | OUTPATIENT
Start: 2017-11-25 | End: 2017-11-25

## 2017-11-25 RX ORDER — SENNA PLUS 8.6 MG/1
2 TABLET ORAL
Qty: 0 | Refills: 0 | COMMUNITY
Start: 2017-11-25

## 2017-11-25 RX ADMIN — OXYCODONE AND ACETAMINOPHEN 2 TABLET(S): 5; 325 TABLET ORAL at 09:31

## 2017-11-25 RX ADMIN — Medication 100 MILLIGRAM(S): at 14:21

## 2017-11-25 RX ADMIN — AMLODIPINE BESYLATE 5 MILLIGRAM(S): 2.5 TABLET ORAL at 06:00

## 2017-11-25 RX ADMIN — Medication 5 MILLIGRAM(S): at 08:47

## 2017-11-25 RX ADMIN — Medication 20 MILLIEQUIVALENT(S): at 08:46

## 2017-11-25 RX ADMIN — METFORMIN HYDROCHLORIDE 750 MILLIGRAM(S): 850 TABLET ORAL at 12:39

## 2017-11-25 RX ADMIN — NEBIVOLOL HYDROCHLORIDE 2.5 MILLIGRAM(S): 5 TABLET ORAL at 06:01

## 2017-11-25 RX ADMIN — Medication 100 MILLIGRAM(S): at 06:00

## 2017-11-25 RX ADMIN — OXYCODONE AND ACETAMINOPHEN 2 TABLET(S): 5; 325 TABLET ORAL at 08:45

## 2017-11-25 NOTE — DISCHARGE NOTE ADULT - PLAN OF CARE
follow up follow up in the office in 1 - 2 weeks with Dr. Smart, call to schedule an appointment. take pain medication PRN. take stool softeners PRN.

## 2017-11-25 NOTE — DISCHARGE NOTE ADULT - MEDICATION SUMMARY - MEDICATIONS TO TAKE
I will START or STAY ON the medications listed below when I get home from the hospital:    oxyCODONE-acetaminophen 5 mg-325 mg oral tablet  -- 1 tab(s) by mouth every 6 hours, As Needed -Moderate Pain (4 - 6) MDD:4  -- Indication: For PRN pain    metFORMIN 750 mg oral tablet, extended release  -- 1 tab(s) by mouth once a day  -- Indication: For Home med     atorvastatin 40 mg oral tablet  -- 1 tab(s) by mouth once a day  -- Indication: For Home med     Bystolic 2.5 mg oral tablet  -- 1 tab(s) by mouth once a day  -- Indication: For Home med     Norvasc 5 mg oral tablet  -- 1 tab(s) by mouth once a day  -- Indication: For Home med     docusate sodium 100 mg oral capsule  -- 1 cap(s) by mouth 3 times a day  -- Indication: For PRN constipation    senna oral tablet  -- 2 tab(s) by mouth once a day (at bedtime)  -- Indication: For PRN constipation

## 2017-11-25 NOTE — DISCHARGE NOTE ADULT - CARE PLAN
Principal Discharge DX:	Gallstones  Goal:	follow up  Instructions for follow-up, activity and diet:	follow up in the office in 1 - 2 weeks with Dr. Smart, call to schedule an appointment. take pain medication PRN. take stool softeners PRN. Principal Discharge DX:	Gallstones  Goal:	follow up  Assessment and plan of treatment:	follow up in the office in 1 - 2 weeks with Dr. Smart, call to schedule an appointment. take pain medication PRN. take stool softeners PRN.

## 2017-11-25 NOTE — PROGRESS NOTE ADULT - PROVIDER SPECIALTY LIST ADULT
Gastroenterology
Internal Medicine
SICU
Surgery
SICU
Gastroenterology
Internal Medicine

## 2017-11-25 NOTE — DISCHARGE NOTE ADULT - PATIENT PORTAL LINK FT
“You can access the [employer] Patient Portal, offered by [facility], by registering with the following website: [email or web address]”

## 2017-11-25 NOTE — DISCHARGE NOTE ADULT - HOSPITAL COURSE
72 year old female with PMhx of DM/HTN/HLD with RUQ, possible acute cholecystitis and choledocholithiasis, s/p ERCP with stent placement, now s/p cholecystectomy c/b hypotension and acute blood loss anemia, RTOR for diagnostic lap. Hemostasis was achieved. The patient's lab work and vitals were monitored closely post operatively. The patient's diet was advanced to reg which she tolerated. The patient's drain was pulled prior to discharge. The patient was sent home in stable condition with PRN pain medication and encouraged to take PRN OTC senna / colace.

## 2017-11-25 NOTE — PROGRESS NOTE ADULT - SUBJECTIVE AND OBJECTIVE BOX
O/N: MARK, VSS, sugars 100-200  11/24: metformin started, likely d/c tmw 11/25     STATUS POST:  11/20: laparoscopic cholecystectomy  11/20: return to OR, diagnostic lap, exploration of gallbladder fossa, clipping of R hepatic artery branch, packing, hemostasis achieved.   11/21: Ex-lap, ligation of branch of right hepatic artery    POD# 4,5 O/N: MARK, VSS, sugars 100-200  11/24: metformin started, likely d/c tmw 11/25     STATUS POST:  11/20: laparoscopic cholecystectomy  11/20: return to OR, diagnostic lap, exploration of gallbladder fossa, clipping of R hepatic artery branch, packing, hemostasis achieved.   11/21: Ex-lap, ligation of branch of right hepatic artery    POD# 4,5    Vital Signs Last 24 Hrs  T(C): 36.8 (25 Nov 2017 06:22), Max: 37.4 (24 Nov 2017 22:29)  T(F): 98.2 (25 Nov 2017 06:22), Max: 99.4 (24 Nov 2017 22:29)  HR: 78 (25 Nov 2017 05:06) (72 - 82)  BP: 142/81 (25 Nov 2017 05:06) (131/63 - 145/70)  BP(mean): 106 (25 Nov 2017 05:06) (89 - 106)  RR: 16 (25 Nov 2017 05:06) (16 - 18)  SpO2: 96% (25 Nov 2017 05:06) (96% - 100%)      I&O's Summary    24 Nov 2017 07:01  -  25 Nov 2017 07:00  --------------------------------------------------------  IN: 0 mL / OUT: 1700 mL / NET: -1700 mL        Gen: NAD   Abd: soft, nt / nd   incision c / d / i

## 2017-11-25 NOTE — PROGRESS NOTE ADULT - ASSESSMENT
A/p: 71yo F with PMhx of DM/HTN/HLD with RUQ, possible acute cholecystitis and choledocholithiasis, s/p ERCP with stent placement, now s/p cholecystectomy c/b hypotension and acute blood loss anemia, RTOR for diagnostic lap - oozing from gallbladder fossa and branch of R hepatic artery - hemostasis achieved    Neuro: percocet prn, IV Tylenol prn benadryl prn   CV: normotensive goals,  norvasc 5 bystolic 2.5   Pulm: satting well on NC  GI/FEN: DM Diet, senna, colace  : voiding  ID: Zosyn ( 11/20-11/21)   Endo: ISS  Heme: s/p 6 u pRBC , 2 FFP 1 plts  PPX: SCDs no SQH 2* bleed risk   Lines: Stephanie Mao (11/20--11/22), RUQ LEE drain  Dispo: PT/OT ordered A/p: 73yo F with PMhx of DM/HTN/HLD with RUQ, possible acute cholecystitis and choledocholithiasis, s/p ERCP with stent placement, now s/p cholecystectomy c/b hypotension and acute blood loss anemia, RTOR for diagnostic lap - oozing from gallbladder fossa and branch of R hepatic artery - hemostasis achieved  pain control   DVT ppx   Reg diet   discharge home

## 2018-01-10 PROBLEM — E78.00 PURE HYPERCHOLESTEROLEMIA, UNSPECIFIED: Chronic | Status: ACTIVE | Noted: 2017-11-18

## 2018-01-10 PROBLEM — E11.9 TYPE 2 DIABETES MELLITUS WITHOUT COMPLICATIONS: Chronic | Status: ACTIVE | Noted: 2017-11-18

## 2018-01-10 PROBLEM — I10 ESSENTIAL (PRIMARY) HYPERTENSION: Chronic | Status: ACTIVE | Noted: 2017-11-18

## 2018-04-26 NOTE — CONSULT NOTE ADULT - SUBJECTIVE AND OBJECTIVE BOX
HPI:  72yr old F with PMHx significant for HTN, DM, Dyslipidemia, sp  in  and sp TAHBSO 3 years ago for benign ovarian cyst who presents to the ED for evaluation of abdominal pain for 2 weeks.  According to patient pain is sharp, RUQ, intermittent, gradually worsening in intensity, nil radiation, no identified aggravating/alleviating factors, associated with subjective fevers, and chills. Finally went to see her PCP yesterday and was told her alk phos was elevated, and given steady increase in pain intensity she presented to the ED for further eval.  Patient denies, n/v/d, bleeding, worsening of pain with food, abdominal distention, prior similar problems in the past.      PAST MEDICAL & SURGICAL HISTORY:  High cholesterol  DM (diabetes mellitus)  HTN (hypertension)  H/O total hysterectomy  History of  delivery, antepartum      REVIEW OF SYSTEMS  Apart from items noted in the HPI a 10point ROS was negative      MEDICATIONS  (STANDING):  amLODIPine   Tablet 5 milliGRAM(s) Oral daily  atorvastatin 40 milliGRAM(s) Oral at bedtime  lactated ringers. 1000 milliLiter(s) (100 mL/Hr) IV Continuous <Continuous>  metFORMIN  milliGRAM(s) Oral daily  piperacillin/tazobactam IVPB. 3.375 Gram(s) IV Intermittent every 6 hours    MEDICATIONS  (PRN):      Allergies  No Known Allergies    Intolerances    SOCIAL HISTORY:  Denies toxic or illicit habits    FAMILY HISTORY:  Non contributory to present illness      Vital Signs Last 24 Hrs  T(C): 36.6 (2017 13:25), Max: 36.7 (2017 12:32)  T(F): 97.9 (2017 13:25), Max: 98 (2017 12:32)  HR: 64 (2017 13:25) (64 - 90)  BP: 125/74 (2017 13:25) (122/72 - 125/74)  BP(mean): --  RR: 16 (2017 13:25) (16 - 18)  SpO2: 96% (2017 13:25) (95% - 96%)    PHYSICAL EXAM:  GEN - elderly F lying in bed in no distress, anicteric, afebrile, not pale  Respiratory: clear to auscultation  Cardiovascular: s1 s2 no M RRR  Gastrointestinal: full, soft, BS+, NT, NR, NG, no masses or organs palpated, negative murphys   Neurological: AAOx3 non focal      LABS:                 12.3   8.9   )-----------( 241      ( 2017 10:37 )             37.0       136  |  98  |  16  ----------------------------<  128<H>  4.2   |  26  |  0.78    Ca    9.3      2017 10:37    TPro  8.4<H>  /  Alb  3.8  /  TBili  1.7<H>  /  DBili  x   /  AST  153<H>  /  ALT  219<H>  /  AlkPhos  503<H>      Urinalysis Basic - ( 2017 14:46 )    Color: Yellow / Appearance: SL Cloudy / SG: <=1.005 / pH: x  Gluc: x / Ketone: Trace mg/dL  / Bili: Negative / Urobili: 2.0 E.U./dL   Blood: x / Protein: NEGATIVE mg/dL / Nitrite: NEGATIVE   Leuk Esterase: Trace / RBC: x / WBC < 5 /HPF   Sq Epi: x / Non Sq Epi: Moderate /HPF / Bacteria: Present /HPF        RADIOLOGY & ADDITIONAL STUDIES:  MR MRCP No Cont (17 @ 12:37)   1. There is a 9 mm stone within the common bile duct approximately 2 cm from the ampulla associated with moderate dilatation of the common bile duct and mild dilatation of the intrahepatic biliary tree. There may be one or 2 smaller stones distal to this, but this is not determined with certainty.    2. The gallbladder contains multiple gallstones, is markedly distended and moderately thickened.    Findings are concerning for acute cholecystitis. Muna Zimmerman

## 2018-04-30 NOTE — PHYSICAL THERAPY INITIAL EVALUATION ADULT - AMBULATION SKILLS, REHAB EVAL
Medicare Wellness Visit  Plan for Preventive Care    A good way for you to stay healthy is to use preventive care.  Medicare covers many services that can help you stay healthy.* The goal of these services is to find any health problems as quickly as possible. Finding problems early can help make them easier to treat.  Your personal plan below lists the services you may need and when they are due.     Health Maintenance Summary     Topic Due On Due Status Completed On Postpone Until Reason    MAMMOGRAM - BREAST CANCER SCREENING Dec 7, 2018 Not Due Dec 7, 2016      Osteoporosis Screening Mar 3, 2018 Overdue       Colorectal Cancer Screening - Colonoscopy Nov 7, 2021 Not Due Nov 7, 2016      Immunization - Pneumococcal Mar 3, 2018 Overdue       Immunization - TDAP Pregnancy  Hidden       Medicare Wellness Visit Apr 30, 2019 Not Due Apr 30, 2018      IMMUNIZATION - DTaP/Tdap/Td Mar 3, 1972 Postponed  Jan 26, 2019 Insurance or Financial    Immunization-Influenza Sep 1, 2018 Not Due       Depression Screening Apr 23, 2019 Not Due Apr 23, 2018      Hepatitis C Screening Mar 3, 2004 Overdue       Lung Cancer Screening Mar 3, 2008 Overdue              Preventive Care for Women and Men    Heart Screenings (Cardiovascular):  · Blood tests are used to check your cholesterol, lipid and triglyceride levels. High levels can increase your risk for heart disease and stroke. High levels can be treated with medications, diet and exercise. Lowering your levels can help keep your heart and blood vessels healthy.  Your provider will order these tests if they are needed.    · An ultrasound is done to see if you have an abdominal aortic aneurysm (AAA).  This is an enlargement of one of the main blood vessels that delivers blood to the body.   In the [place], 9,000 deaths are caused by AAA.  You may not even know you have this problem and as many as 1 in 3 people will have a serious problem if it is not treated.  Early diagnosis  allows for more effective treatment and cure.  If you have a family history of AAA or are a male age 65-75 who has smoked, you are at higher risk of an AAA.  Your provider can order this test, if needed.    Colorectal Screening:  · There are many tests that are used to check for cancer of your colon and rectum. You and your provider should discuss what test is best for you and when to have it done.  Options include:  · Screening Colonoscopy: exam of the entire colon, seen through a flexible lighted tube.  · Flexible Sigmoidoscopy: exam of the last third (sigmoid portion) of the colon and rectum, seen through a flexible lighted tube.  · Cologuard DNA stool test: a sample of your stool is used to screen for cancer and unseen blood in your stool.  · Fecal Occult Blood Test: a sample of your stool is studied to find any unseen blood    Flu Shot:  · An immunization that helps to prevent influenza (the flu). You should get this every year. The best time to get the shot is in the fall.    Pneumococcal Shot:  • Vaccines are available that can help prevent pneumococcal disease, which is any type of infection caused by Streptococcus pneumoniae bacteria.   Their use can prevent some cases of pneumonia, meningitis, and sepsis. There are two types of pneumococcal vaccines:   o Conjugate vaccines (PCV-13 or Prevnar 13®) - helps protect against the 13 types of pneumococcal bacteria that are the most common causes of serious infections in children and adults.    o Polysaccharide vaccine (PPSV23 or Pufqpuqek51®) - helps protect against 23 types of pneumococcal bacteria for patients who are recommended to get it.  These vaccines should be given at least 12 months apart.  A booster is usually not needed.     Hepatitis B Shot:  · An immunization that helps to protect people from getting Hepatitis B. Hepatitis B is a virus that spreads through contact with infected blood or body fluids. Many people with the virus do not have symptoms.   The virus can lead to serious problems, such as liver disease. Some people are at higher risk than others. Your doctor will tell you if you need this shot.     Diabetes Screening:  · A test to measure sugar (glucose) in your blood is called a fasting blood sugar. Fasting means you cannot have food or drink for at least 8 hours before the test. This test can detect diabetes long before you may notice symptoms.    Glaucoma Screening:  · Glaucoma screening is performed by your eye doctor. The test measures the fluid pressure inside your eyes to determine if you have glaucoma.     Hepatitis C Screening:  · A blood test to see if you have the hepatitis C virus.  Hepatitis C attacks the liver and is a major cause of chronic liver disease.  Medicare will cover a single screening for all adults born between 1945 & 1965, or high risk patients (people who have injected illegal drugs or people who have had blood transfusions).  High risk patients who continue to inject illegal drugs can be screened for Hepatitis C every year.    Smoking and Tobacco-Use Cessation Counseling:  · Tobacco is the single greatest cause of disease and early death in our country today. Medication and counseling together can increase a person’s chance of quitting for good.   · Medicare covers two quitting attempts per year, with four counseling sessions per attempt (eight sessions in a 12 month period)    Preventive Screening tests for Women    Screening Mammograms and Breast Exams:  · An x-ray of your breasts to check for breast cancer before you or your doctor may be able to feel it.  If breast cancer is found early it can usually be treated with success.    Pelvic Exams and Pap Tests:  · An exam to check for cervical and vaginal cancer. A Pap test is a lab test in which cells are taken from your cervix and sent to the lab to look for signs of cervical cancer. If cancer of the cervix is found early, chances for a cure are good. Testing can generally  end at age 65, or if a woman has a hysterectomy for a benign condition. Your provider may recommend more frequent testing if certain abnormal results are found.    Bone Mass Measurements:  · A painless x-ray of your bone density to see if you are at risk for a broken bone. Bone density refers to the thickness of bones or how tightly the bone tissue is packed.    Preventive Screening tests for Men    Prostate Screening:  · PSA - Prostate Cancer blood test.  Experts do not recommend routine screening of healthy men with no signs or symptoms of prostate disease.  However, men should not ignore urinary symptoms, and should discuss their family history with their doctor.    *Medicare pays for many preventive services to keep you healthy. For some of these services, you might have to pay a deductible, coinsurance, and / or copayment.  The amounts vary depending on the type of services you need and the kind of Medicare health plan you have.               independent

## 2018-12-04 NOTE — ED ADULT TRIAGE NOTE - BMI (KG/M2)
CC:  Anai Hahn is here today for Physical (Patient presents in clinic for annual physical. )       Medications: medications verified and updated  Refills needed today?  YES  denies Latex allergy or sensitivity  Patient would like communication of their results via:      Cell Phone:   Telephone Information:   Mobile [phone]     Okay to leave a message containing results? Yes  Tobacco history: verified  Advanced directives: No        Patient's current [facility] status: Active.  Health Maintenance Due   Topic Date Due   • Cervical Cancer Screening HPV CO-Testing  04/26/2018   • Influenza Vaccine (1) 08/01/2018   • Depression Screening  11/13/2018     Patient is due for the topics as listed above and wishes to discuss with .          [facility] status addressed. Patient Active.               21.5

## 2023-09-25 NOTE — ED ADULT NURSE NOTE - LOCATION
Photo Preface (Leave Blank If You Do Not Want): Photographs were obtained today
Detail Level: Zone
abdomen